# Patient Record
Sex: FEMALE | Race: WHITE | NOT HISPANIC OR LATINO | Employment: OTHER | URBAN - METROPOLITAN AREA
[De-identification: names, ages, dates, MRNs, and addresses within clinical notes are randomized per-mention and may not be internally consistent; named-entity substitution may affect disease eponyms.]

---

## 2017-07-11 PROBLEM — I20.9 ANGINA PECTORIS: Status: ACTIVE | Noted: 2017-07-11

## 2017-11-08 ENCOUNTER — LAB VISIT (OUTPATIENT)
Dept: LAB | Facility: OTHER | Age: 77
End: 2017-11-08
Attending: INTERNAL MEDICINE
Payer: COMMERCIAL

## 2017-11-08 DIAGNOSIS — N39.0 URINARY TRACT INFECTION WITHOUT HEMATURIA, SITE UNSPECIFIED: ICD-10-CM

## 2017-11-08 PROCEDURE — 87086 URINE CULTURE/COLONY COUNT: CPT

## 2017-11-09 LAB — BACTERIA UR CULT: NO GROWTH

## 2017-11-29 ENCOUNTER — HOSPITAL ENCOUNTER (OUTPATIENT)
Dept: RADIOLOGY | Facility: OTHER | Age: 77
Discharge: HOME | End: 2017-11-29
Attending: INTERNAL MEDICINE
Payer: COMMERCIAL

## 2017-11-29 DIAGNOSIS — J44.9 CHRONIC OBSTRUCTIVE PULMONARY DISEASE, UNSPECIFIED COPD TYPE: ICD-10-CM

## 2017-11-29 PROCEDURE — 71020 XR CHEST PA AND LATERAL: CPT | Mod: TC

## 2017-11-29 PROCEDURE — 71020 XR CHEST PA AND LATERAL: CPT | Mod: 26,,, | Performed by: RADIOLOGY

## 2017-12-07 ENCOUNTER — HOSPITAL ENCOUNTER (OUTPATIENT)
Dept: PULMONOLOGY | Facility: OTHER | Age: 77
Discharge: HOME | End: 2017-12-07
Attending: INTERNAL MEDICINE
Payer: COMMERCIAL

## 2017-12-07 DIAGNOSIS — J44.9 CHRONIC OBSTRUCTIVE PULMONARY DISEASE, UNSPECIFIED COPD TYPE: ICD-10-CM

## 2017-12-07 PROCEDURE — 94727 GAS DIL/WSHOT DETER LNG VOL: CPT

## 2017-12-07 PROCEDURE — 94060 EVALUATION OF WHEEZING: CPT

## 2017-12-07 PROCEDURE — 94729 DIFFUSING CAPACITY: CPT

## 2017-12-07 PROCEDURE — 25000242 PHARM REV CODE 250 ALT 637 W/ HCPCS

## 2017-12-12 NOTE — PROCEDURES
The patient has a forced vital capacity of 1.69 L, which is 64% of predicted,   FEV1 is 1.20 which is 66% of predicted.  FEV1/FVC is 103% of predicted.  FEF   25-75 is decreased at 36% of predicted.  There is no significant response to   bronchodilators, although the small airways improved to 18%.  Total lung   capacity is 71% based on a decreased expiratory reserve volume, which can be   seen with abdominal obesity or kyphoscoliosis.  Diffusing capacity is normal.    Flow volume loop is consistent with above.    ASSESSMENT:  1.  Mild airway obstruction primarily in the small airways with a mild but not   significant response to bronchodilators.  2.  Moderate restrictive ventilatory defect as discussed above.  3.  Normal diffusion capacity.      CCS/HN  dd: 12/11/2017 08:44:01 (CST)  td: 12/11/2017 13:32:12 (CST)  Doc ID   #[id number]  Job ID #[id number]    CC: JOCELYN TROY M.D.

## 2018-02-15 ENCOUNTER — HOSPITAL ENCOUNTER (OUTPATIENT)
Dept: PULMONOLOGY | Facility: OTHER | Age: 78
Discharge: HOME | End: 2018-02-15
Attending: INTERNAL MEDICINE
Payer: COMMERCIAL

## 2018-02-15 VITALS — WEIGHT: 221.31 LBS | BODY MASS INDEX: 37.78 KG/M2 | HEIGHT: 64 IN

## 2018-02-15 DIAGNOSIS — J98.4 RESTRICTIVE LUNG DISEASE: ICD-10-CM

## 2018-02-15 PROCEDURE — 94618 PULMONARY STRESS TESTING: CPT

## 2018-03-06 ENCOUNTER — OFFICE VISIT (OUTPATIENT)
Dept: CARDIOLOGY | Facility: CLINIC | Age: 78
End: 2018-03-06
Attending: INTERNAL MEDICINE
Payer: COMMERCIAL

## 2018-03-06 VITALS
BODY MASS INDEX: 37.05 KG/M2 | WEIGHT: 217 LBS | HEIGHT: 64 IN | DIASTOLIC BLOOD PRESSURE: 74 MMHG | SYSTOLIC BLOOD PRESSURE: 167 MMHG | HEART RATE: 86 BPM

## 2018-03-06 DIAGNOSIS — Z86.711 HISTORY OF PULMONARY EMBOLISM: ICD-10-CM

## 2018-03-06 DIAGNOSIS — G47.33 OSA (OBSTRUCTIVE SLEEP APNEA): ICD-10-CM

## 2018-03-06 DIAGNOSIS — J44.9 CHRONIC OBSTRUCTIVE PULMONARY DISEASE, UNSPECIFIED COPD TYPE: ICD-10-CM

## 2018-03-06 DIAGNOSIS — I50.30 DIASTOLIC HEART FAILURE, UNSPECIFIED HEART FAILURE CHRONICITY: ICD-10-CM

## 2018-03-06 DIAGNOSIS — E66.09 OBESITY DUE TO EXCESS CALORIES WITH SERIOUS COMORBIDITY, UNSPECIFIED CLASSIFICATION: ICD-10-CM

## 2018-03-06 DIAGNOSIS — I10 ESSENTIAL HYPERTENSION: Primary | ICD-10-CM

## 2018-03-06 PROCEDURE — 3078F DIAST BP <80 MM HG: CPT | Mod: CPTII,S$GLB,, | Performed by: INTERNAL MEDICINE

## 2018-03-06 PROCEDURE — 3077F SYST BP >= 140 MM HG: CPT | Mod: CPTII,S$GLB,, | Performed by: INTERNAL MEDICINE

## 2018-03-06 PROCEDURE — 99213 OFFICE O/P EST LOW 20 MIN: CPT | Mod: S$GLB,,, | Performed by: INTERNAL MEDICINE

## 2018-03-20 PROBLEM — G47.33 OSA (OBSTRUCTIVE SLEEP APNEA): Status: ACTIVE | Noted: 2018-03-20

## 2018-03-20 NOTE — PROGRESS NOTES
Subjective:    Patient ID:  Yun Silva is a 78 y.o. female     HPI  Here for F/U of HBP, diastolic CHF, H/O PE, COPD, obesity, THERESE.    I still get a little winded upon going up stairs.    Current Outpatient Prescriptions   Medication Sig    acebutolol (SECTRAL) 200 MG capsule TAKE 1 CAPSULE (200 MG TOTAL) BY MOUTH 2 (TWO) TIMES DAILY.    amLODIPine (NORVASC) 5 MG tablet TAKE 1 TABLET (5 MG TOTAL) BY MOUTH ONCE DAILY.    aspirin (ECOTRIN) 81 MG EC tablet Take 162 mg by mouth.     cycloSPORINE (RESTASIS) 0.05 % ophthalmic emulsion Place 1 drop into both eyes 2 (two) times daily.     darifenacin (ENABLEX) 15 mg 24 hr tablet Take 15 mg by mouth once daily.    doxycycline (VIBRA-TABS) 100 MG tablet Take 1 tablet (100 mg total) by mouth 2 (two) times daily.    gabapentin (NEURONTIN) 300 MG capsule Take I each evening    hydrALAZINE (APRESOLINE) 25 MG tablet TAKE 2 TABLETS BY MOUTH 4 TIMES A DAY    MULTIVITAMIN W-MINERALS/LUTEIN (CENTRUM SILVER ORAL) Take by mouth.    TOVIAZ 8 mg Tb24 Take 1 tablet by mouth once daily.    trimethoprim (TRIMPEX) 100 mg Tab Take 1 tablet (100 mg total) by mouth once daily.    valsartan (DIOVAN) 160 MG tablet TAKE 1 TABLET BY MOUTH EVERY DAY    valsartan-hydrochlorothiazide (DIOVAN-HCT) 160-25 mg per tablet TAKE 1 TABLET BY MOUTH TWICE A DAY    VESICARE 10 mg tablet Take 1 tablet by mouth once daily.    XARELTO 15 mg Tab TAKE 1 TABLET BY MOUTH DAILY WITH DINNER OR EVENING MEAL.     No current facility-administered medications for this visit.          Review of Systems   Constitution: Positive for weight gain. Negative for chills, decreased appetite, fever and weight loss.   HENT: Negative for congestion, hearing loss and sore throat.    Eyes: Negative for blurred vision, double vision and visual disturbance.   Cardiovascular: Negative for chest pain, claudication, dyspnea on exertion, leg swelling, palpitations and syncope.   Respiratory: Negative for cough, hemoptysis,  shortness of breath, sputum production and wheezing.    Endocrine: Negative for cold intolerance and heat intolerance.   Hematologic/Lymphatic: Negative for bleeding problem. Does not bruise/bleed easily.   Skin: Negative for color change, dry skin, flushing and itching.   Musculoskeletal: Negative for back pain, joint pain and myalgias.   Gastrointestinal: Negative for abdominal pain, anorexia, constipation, diarrhea, dysphagia, nausea and vomiting.        No bleeding per rectum   Genitourinary: Negative for dysuria, flank pain, frequency, hematuria and nocturia.   Neurological: Negative for dizziness, headaches, light-headedness, loss of balance, seizures and tremors.   Psychiatric/Behavioral: Negative for altered mental status and depression.        Objective:    Physical Exam   Constitutional: She is oriented to person, place, and time. She appears well-developed and well-nourished.   HENT:   Head: Normocephalic and atraumatic.   Nose: Nose normal.   Mouth/Throat: Oropharynx is clear and moist.   Eyes: Conjunctivae and EOM are normal. Pupils are equal, round, and reactive to light.   Neck: Neck supple. No tracheal deviation present. No thyromegaly present.   Cardiovascular: Normal rate, regular rhythm and intact distal pulses.  Exam reveals no gallop and no friction rub.    No murmur heard.  Pulmonary/Chest: No respiratory distress. She has no wheezes. She has no rales. She exhibits no tenderness.   Abdominal: Soft. Bowel sounds are normal. She exhibits no distension and no mass. There is no tenderness. There is no rebound and no guarding.   Musculoskeletal: Normal range of motion.   Lymphadenopathy:     She has no cervical adenopathy.   Neurological: She is alert and oriented to person, place, and time.   Skin: Skin is warm and dry.   Psychiatric: Her behavior is normal.         Assessment:       1. Essential hypertension    2. Diastolic heart failure, unspecified heart failure chronicity    3. History of  pulmonary embolism    4. Chronic obstructive pulmonary disease, unspecified COPD type    5. Obesity due to excess calories with serious comorbidity, unspecified classification    6. THERESE (obstructive sleep apnea)         Plan:       Urged weight reduction. Discussed at length.  Continue the same meds.

## 2018-05-16 ENCOUNTER — HOSPITAL ENCOUNTER (OUTPATIENT)
Dept: RADIOLOGY | Facility: OTHER | Age: 78
Discharge: HOME | End: 2018-05-16
Attending: INTERNAL MEDICINE
Payer: COMMERCIAL

## 2018-05-16 DIAGNOSIS — M79.672 FOOT PAIN, LEFT: ICD-10-CM

## 2018-05-16 PROCEDURE — 73630 X-RAY EXAM OF FOOT: CPT | Mod: TC,FY,LT

## 2018-05-16 PROCEDURE — 73630 X-RAY EXAM OF FOOT: CPT | Mod: 26,LT,, | Performed by: RADIOLOGY

## 2018-07-10 ENCOUNTER — OFFICE VISIT (OUTPATIENT)
Dept: CARDIOLOGY | Facility: CLINIC | Age: 78
End: 2018-07-10
Attending: INTERNAL MEDICINE
Payer: COMMERCIAL

## 2018-07-10 VITALS
HEART RATE: 64 BPM | BODY MASS INDEX: 36.02 KG/M2 | SYSTOLIC BLOOD PRESSURE: 141 MMHG | DIASTOLIC BLOOD PRESSURE: 76 MMHG | HEIGHT: 64 IN | WEIGHT: 211 LBS

## 2018-07-10 DIAGNOSIS — I82.509 CHRONIC DEEP VEIN THROMBOSIS (DVT) OF LOWER EXTREMITY, UNSPECIFIED LATERALITY, UNSPECIFIED VEIN: ICD-10-CM

## 2018-07-10 DIAGNOSIS — Z86.711 HISTORY OF PULMONARY EMBOLISM: ICD-10-CM

## 2018-07-10 DIAGNOSIS — G60.9 IDIOPATHIC PERIPHERAL NEUROPATHY: ICD-10-CM

## 2018-07-10 DIAGNOSIS — J44.9 CHRONIC OBSTRUCTIVE PULMONARY DISEASE, UNSPECIFIED COPD TYPE: ICD-10-CM

## 2018-07-10 DIAGNOSIS — I10 ESSENTIAL HYPERTENSION: Primary | ICD-10-CM

## 2018-07-10 DIAGNOSIS — I27.21 PULMONARY HYPERTENSION SECONDARY TO RAISED PULMONARY VASCULAR RESISTANCE: ICD-10-CM

## 2018-07-10 DIAGNOSIS — Z79.01 CHRONIC ANTICOAGULATION: ICD-10-CM

## 2018-07-10 DIAGNOSIS — E66.09 OBESITY DUE TO EXCESS CALORIES WITH SERIOUS COMORBIDITY, UNSPECIFIED CLASSIFICATION: ICD-10-CM

## 2018-07-10 DIAGNOSIS — I50.30 DIASTOLIC HEART FAILURE, UNSPECIFIED HF CHRONICITY: ICD-10-CM

## 2018-07-10 DIAGNOSIS — G47.33 OSA (OBSTRUCTIVE SLEEP APNEA): ICD-10-CM

## 2018-07-10 PROCEDURE — 3078F DIAST BP <80 MM HG: CPT | Mod: CPTII,S$GLB,, | Performed by: INTERNAL MEDICINE

## 2018-07-10 PROCEDURE — 3077F SYST BP >= 140 MM HG: CPT | Mod: CPTII,S$GLB,, | Performed by: INTERNAL MEDICINE

## 2018-07-10 PROCEDURE — 99214 OFFICE O/P EST MOD 30 MIN: CPT | Mod: S$GLB,,, | Performed by: INTERNAL MEDICINE

## 2018-07-10 NOTE — PROGRESS NOTES
Subjective:    Patient ID:  Yun Silva is a 78 y.o. female     HPI    Here for follow-up of essential hypertension, diastolic heart failure, previous history of DVT, pulmonary embolism, chronic anticoagulation, obesity, obstructive sleep apnea, chronic obstructive pulmonary disease, pulmonary hypertension, idiopathic peripheral neuropathy.    I had my total knee replacement in April and I am doing extremely well.  I had lost a lot of weight, but I have gained a lot of it back.  I am now getting around well.    Current Outpatient Prescriptions   Medication Sig    acebutolol (SECTRAL) 200 MG capsule TAKE ONE CAPSULE BY MOUTH TWICE A DAY    amLODIPine (NORVASC) 5 MG tablet TAKE 1 TABLET BY MOUTH ONCE DAILY    amLODIPine (NORVASC) 5 MG tablet TAKE 1 TABLET BY MOUTH ONCE DAILY    aspirin (ECOTRIN) 81 MG EC tablet Take 162 mg by mouth.     cycloSPORINE (RESTASIS) 0.05 % ophthalmic emulsion Place 1 drop into both eyes 2 (two) times daily.     darifenacin (ENABLEX) 15 mg 24 hr tablet Take 15 mg by mouth once daily.    doxycycline (VIBRA-TABS) 100 MG tablet Take 1 tablet (100 mg total) by mouth 2 (two) times daily.    gabapentin (NEURONTIN) 800 MG tablet Take 1 tablet by mouth 3 (three) times daily.    hydrALAZINE (APRESOLINE) 25 MG tablet TAKE 2 TABLETS BY MOUTH 4 TIMES A DAY    MULTIVITAMIN W-MINERALS/LUTEIN (CENTRUM SILVER ORAL) Take by mouth.    oxyCODONE-acetaminophen (PERCOCET) 5-325 mg per tablet Take 1-2 tablets by mouth every 4 (four) hours.    TOVIAZ 8 mg Tb24 Take 1 tablet by mouth once daily.    trimethoprim (TRIMPEX) 100 mg Tab Take 1 tablet (100 mg total) by mouth once daily.    valsartan (DIOVAN) 160 MG tablet TAKE 1 TABLET BY MOUTH EVERY DAY    valsartan-hydrochlorothiazide (DIOVAN-HCT) 160-25 mg per tablet TAKE 1 TABLET BY MOUTH TWICE A DAY    VESICARE 10 mg tablet Take 1 tablet by mouth once daily.    XARELTO 15 mg Tab TAKE 1 TABLET BY MOUTH DAILY WITH DINNER OR EVENING MEAL.     No  "current facility-administered medications for this visit.          Review of Systems   Constitution: Negative for chills, decreased appetite, fever, weight gain and weight loss.   HENT: Negative for congestion, hearing loss and sore throat.    Eyes: Negative for blurred vision, double vision and visual disturbance.   Cardiovascular: Negative for chest pain, claudication, dyspnea on exertion, leg swelling, palpitations and syncope.   Respiratory: Negative for cough, hemoptysis, shortness of breath, sputum production and wheezing.    Endocrine: Negative for cold intolerance and heat intolerance.   Hematologic/Lymphatic: Negative for bleeding problem. Does not bruise/bleed easily.   Skin: Negative for color change, dry skin, flushing and itching.   Musculoskeletal: Negative for back pain, joint pain and myalgias.   Gastrointestinal: Negative for abdominal pain, anorexia, constipation, diarrhea, dysphagia, nausea and vomiting.        No bleeding per rectum   Genitourinary: Negative for dysuria, flank pain, frequency, hematuria and nocturia.   Neurological: Negative for dizziness, headaches, light-headedness, loss of balance, seizures and tremors.   Psychiatric/Behavioral: Negative for altered mental status and depression.         Vitals:    07/10/18 1545   BP: (!) 141/76   Pulse: 64   Weight: 95.7 kg (211 lb)   Height: 5' 4" (1.626 m)     Objective:    Physical Exam   Constitutional: She is oriented to person, place, and time. She appears well-developed and well-nourished.   HENT:   Head: Normocephalic and atraumatic.   Nose: Nose normal.   Mouth/Throat: Oropharynx is clear and moist.   Eyes: Conjunctivae and EOM are normal. Pupils are equal, round, and reactive to light.   Neck: Neck supple. No tracheal deviation present. No thyromegaly present.   Cardiovascular: Normal rate, regular rhythm and intact distal pulses.  Exam reveals no gallop and no friction rub.    No murmur heard.  Pulmonary/Chest: No respiratory " distress. She has no wheezes. She has no rales. She exhibits no tenderness.   Abdominal: Soft. Bowel sounds are normal. She exhibits no distension and no mass. There is no tenderness. There is no rebound and no guarding.   Musculoskeletal: Normal range of motion.   Lymphadenopathy:     She has no cervical adenopathy.   Neurological: She is alert and oriented to person, place, and time.   Skin: Skin is warm and dry.   Psychiatric: Her behavior is normal.         Assessment:       1. Essential hypertension    2. Diastolic heart failure, unspecified HF chronicity    3. History of pulmonary embolism    4. Chronic anticoagulation    5. Chronic deep vein thrombosis (DVT) of lower extremity, unspecified laterality, unspecified vein    6. Obesity due to excess calories with serious comorbidity, unspecified classification    7. THERESE (obstructive sleep apnea)    8. Chronic obstructive pulmonary disease, unspecified COPD type    9. Pulmonary hypertension secondary to raised pulmonary vascular resistance    10. Idiopathic peripheral neuropathy         Plan:       Stable.  Continue the same pharmacological regimen.  Consider trying to reduce the gabapentin dose by half.  Urged low-carbohydrate diet, weight reduction.

## 2018-09-21 ENCOUNTER — CLINICAL SUPPORT (OUTPATIENT)
Dept: CARDIOLOGY | Facility: CLINIC | Age: 78
End: 2018-09-21
Payer: COMMERCIAL

## 2018-09-21 DIAGNOSIS — R00.1 BRADYCARDIA: Primary | ICD-10-CM

## 2018-09-21 PROCEDURE — 93224 XTRNL ECG REC UP TO 48 HRS: CPT | Mod: S$GLB,,, | Performed by: INTERNAL MEDICINE

## 2018-09-24 ENCOUNTER — OFFICE VISIT (OUTPATIENT)
Dept: CARDIOLOGY | Facility: CLINIC | Age: 78
End: 2018-09-24
Attending: INTERNAL MEDICINE
Payer: COMMERCIAL

## 2018-09-24 VITALS
HEART RATE: 80 BPM | HEIGHT: 64 IN | WEIGHT: 217 LBS | BODY MASS INDEX: 37.05 KG/M2 | SYSTOLIC BLOOD PRESSURE: 140 MMHG | DIASTOLIC BLOOD PRESSURE: 66 MMHG

## 2018-09-24 DIAGNOSIS — I10 ESSENTIAL HYPERTENSION: ICD-10-CM

## 2018-09-24 DIAGNOSIS — G47.33 OSA (OBSTRUCTIVE SLEEP APNEA): Primary | ICD-10-CM

## 2018-09-24 DIAGNOSIS — Z79.01 CHRONIC ANTICOAGULATION: ICD-10-CM

## 2018-09-24 DIAGNOSIS — Z86.711 HISTORY OF PULMONARY EMBOLISM: ICD-10-CM

## 2018-09-24 DIAGNOSIS — E66.09 OBESITY DUE TO EXCESS CALORIES WITH SERIOUS COMORBIDITY, UNSPECIFIED CLASSIFICATION: ICD-10-CM

## 2018-09-24 DIAGNOSIS — I27.21 PULMONARY HYPERTENSION SECONDARY TO RAISED PULMONARY VASCULAR RESISTANCE: ICD-10-CM

## 2018-09-24 DIAGNOSIS — J44.9 CHRONIC OBSTRUCTIVE PULMONARY DISEASE, UNSPECIFIED COPD TYPE: ICD-10-CM

## 2018-09-24 DIAGNOSIS — I44.1 SECOND DEGREE HEART BLOCK: ICD-10-CM

## 2018-09-24 DIAGNOSIS — I50.30 DIASTOLIC HEART FAILURE, UNSPECIFIED HF CHRONICITY: ICD-10-CM

## 2018-09-24 PROCEDURE — 3078F DIAST BP <80 MM HG: CPT | Mod: CPTII,S$GLB,, | Performed by: INTERNAL MEDICINE

## 2018-09-24 PROCEDURE — 99214 OFFICE O/P EST MOD 30 MIN: CPT | Mod: S$GLB,,, | Performed by: INTERNAL MEDICINE

## 2018-09-24 PROCEDURE — 3077F SYST BP >= 140 MM HG: CPT | Mod: CPTII,S$GLB,, | Performed by: INTERNAL MEDICINE

## 2018-09-24 NOTE — PROGRESS NOTES
Subjective:    Patient ID:  Yun Silva is a 78 y.o. female     HPI here for follow-up of essential hypertension, COPD, history of pulmonary embolism, obstructive sleep apnea, pulmonary hypertension, diastolic left ventricular dysfunction.    Saw Dr. alessandra Hsieh in the office last week, and complained to him that I was getting short of breath for the last 2 weeks.  An electrocardiogram done in his office showed 2-1 second-degree heart block.  A Holter monitor was done on Friday, and this showed episodes of second-degree heart block during sleep, with resultant pulse rates in the low 30s.    Current Outpatient Medications   Medication Sig    fluticasone/umeclidin/vilanter (TRELEGY ELLIPTA INHL) Inhale into the lungs.    acebutolol (SECTRAL) 200 MG capsule TAKE ONE CAPSULE BY MOUTH TWICE A DAY    albuterol (PROVENTIL/VENTOLIN HFA) 90 mcg/actuation inhaler 2 puffs Q 4h prn wheezing or shortness of breath.    amLODIPine (NORVASC) 5 MG tablet TAKE 1 TABLET BY MOUTH ONCE DAILY    amLODIPine (NORVASC) 5 MG tablet TAKE 1 TABLET BY MOUTH ONCE DAILY    aspirin (ECOTRIN) 81 MG EC tablet Take 162 mg by mouth.     cycloSPORINE (RESTASIS) 0.05 % ophthalmic emulsion Place 1 drop into both eyes 2 (two) times daily.     darifenacin (ENABLEX) 15 mg 24 hr tablet Take 15 mg by mouth once daily.    doxycycline (VIBRA-TABS) 100 MG tablet Take 1 tablet (100 mg total) by mouth 2 (two) times daily.    gabapentin (NEURONTIN) 800 MG tablet Take 1 tablet by mouth 2 (two) times daily.     hydrALAZINE (APRESOLINE) 25 MG tablet TAKE 2 TABLETS BY MOUTH 4 TIMES A DAY    MULTIVITAMIN W-MINERALS/LUTEIN (CENTRUM SILVER ORAL) Take by mouth.    oxyCODONE-acetaminophen (PERCOCET) 5-325 mg per tablet Take 1-2 tablets by mouth every 4 (four) hours.    TOVIAZ 8 mg Tb24 Take 1 tablet by mouth once daily.    trimethoprim (TRIMPEX) 100 mg Tab Take 1 tablet (100 mg total) by mouth once daily.    valsartan (DIOVAN) 160 MG tablet TAKE 1  "TABLET BY MOUTH EVERY DAY    valsartan-hydrochlorothiazide (DIOVAN-HCT) 160-25 mg per tablet TAKE 1 TABLET BY MOUTH TWICE A DAY    VESICARE 10 mg tablet Take 1 tablet by mouth once daily.    XARELTO 15 mg Tab TAKE 1 TABLET BY MOUTH DAILY WITH DINNER OR EVENING MEAL.    XARELTO 15 mg Tab TAKE 1 TABLET BY MOUTH DAILY WITH DINNER OR EVENING MEAL.     No current facility-administered medications for this visit.          Review of Systems   Constitution: Negative for chills, decreased appetite, fever, weight gain and weight loss.   HENT: Negative for congestion, hearing loss and sore throat.    Eyes: Negative for blurred vision, double vision and visual disturbance.   Cardiovascular: Positive for dyspnea on exertion. Negative for chest pain, claudication, leg swelling, palpitations and syncope.   Respiratory: Negative for cough, hemoptysis, shortness of breath, sputum production and wheezing.    Endocrine: Negative for cold intolerance and heat intolerance.   Hematologic/Lymphatic: Negative for bleeding problem. Does not bruise/bleed easily.   Skin: Negative for color change, dry skin, flushing and itching.   Musculoskeletal: Negative for back pain, joint pain and myalgias.   Gastrointestinal: Negative for abdominal pain, anorexia, constipation, diarrhea, dysphagia, nausea and vomiting.        No bleeding per rectum   Genitourinary: Negative for dysuria, flank pain, frequency, hematuria and nocturia.   Neurological: Negative for dizziness, headaches, light-headedness, loss of balance, seizures and tremors.   Psychiatric/Behavioral: Negative for altered mental status and depression.         Vitals:    09/24/18 1141   BP: (!) 140/66   Pulse: 80   Weight: 98.4 kg (217 lb)   Height: 5' 4" (1.626 m)     Objective:    Physical Exam   Constitutional: She is oriented to person, place, and time. She appears well-developed and well-nourished.   HENT:   Head: Normocephalic and atraumatic.   Nose: Nose normal.   Mouth/Throat: " Oropharynx is clear and moist.   Eyes: Conjunctivae and EOM are normal. Pupils are equal, round, and reactive to light.   Neck: Neck supple. No tracheal deviation present. No thyromegaly present.   Cardiovascular: Normal rate and regular rhythm. Exam reveals no gallop and no friction rub.   No murmur heard.  Pulmonary/Chest: No respiratory distress. She has no wheezes. She has no rales. She exhibits no tenderness.   Abdominal: Soft. Bowel sounds are normal. She exhibits no distension and no mass. There is no tenderness. There is no rebound and no guarding.   Musculoskeletal: Normal range of motion.   Lymphadenopathy:     She has no cervical adenopathy.   Neurological: She is alert and oriented to person, place, and time.   Skin: Skin is warm and dry.   Psychiatric: Her behavior is normal.         Assessment:       1. THERESE (obstructive sleep apnea)    2. Second degree heart block    3. Obesity due to excess calories with serious comorbidity, unspecified classification    4. History of pulmonary embolism    5. Chronic anticoagulation    6. Essential hypertension    7. Diastolic heart failure, unspecified HF chronicity    8. Chronic obstructive pulmonary disease, unspecified COPD type    9. Pulmonary hypertension secondary to raised pulmonary vascular resistance         Plan:       Consult Dr. Otoniel Hogan for placement of a dual-chamber pacemaker.

## 2018-09-26 DIAGNOSIS — R00.1 BRADYCARDIA: ICD-10-CM

## 2018-09-26 DIAGNOSIS — I44.1 AV BLOCK, 2ND DEGREE: Primary | ICD-10-CM

## 2018-09-28 ENCOUNTER — HOSPITAL ENCOUNTER (OUTPATIENT)
Dept: PREADMISSION TESTING | Facility: OTHER | Age: 78
Discharge: HOME | End: 2018-09-28
Attending: INTERNAL MEDICINE
Payer: COMMERCIAL

## 2018-09-28 VITALS
DIASTOLIC BLOOD PRESSURE: 69 MMHG | BODY MASS INDEX: 36.88 KG/M2 | HEART RATE: 62 BPM | OXYGEN SATURATION: 95 % | TEMPERATURE: 98 F | WEIGHT: 216 LBS | HEIGHT: 64 IN | SYSTOLIC BLOOD PRESSURE: 143 MMHG

## 2018-09-28 LAB
ANION GAP SERPL CALC-SCNC: 9 MMOL/L
APTT BLDCRRT: 36.2 SEC
BASOPHILS # BLD AUTO: 0.03 K/UL
BASOPHILS NFR BLD: 0.3 %
BUN SERPL-MCNC: 12 MG/DL
CALCIUM SERPL-MCNC: 9.7 MG/DL
CHLORIDE SERPL-SCNC: 103 MMOL/L
CO2 SERPL-SCNC: 29 MMOL/L
CREAT SERPL-MCNC: 0.7 MG/DL
DIFFERENTIAL METHOD: ABNORMAL
EOSINOPHIL # BLD AUTO: 0.2 K/UL
EOSINOPHIL NFR BLD: 1.6 %
ERYTHROCYTE [DISTWIDTH] IN BLOOD BY AUTOMATED COUNT: 15.5 %
EST. GFR  (AFRICAN AMERICAN): >60 ML/MIN/1.73 M^2
EST. GFR  (NON AFRICAN AMERICAN): >60 ML/MIN/1.73 M^2
GLUCOSE SERPL-MCNC: 98 MG/DL
HCT VFR BLD AUTO: 38.4 %
HGB BLD-MCNC: 11.6 G/DL
INR PPP: 1
LYMPHOCYTES # BLD AUTO: 3.9 K/UL
LYMPHOCYTES NFR BLD: 37.3 %
MCH RBC QN AUTO: 26 PG
MCHC RBC AUTO-ENTMCNC: 30.2 G/DL
MCV RBC AUTO: 86 FL
MONOCYTES # BLD AUTO: 0.8 K/UL
MONOCYTES NFR BLD: 7.7 %
NEUTROPHILS # BLD AUTO: 5.5 K/UL
NEUTROPHILS NFR BLD: 52.7 %
PLATELET # BLD AUTO: 271 K/UL
PMV BLD AUTO: 10.4 FL
POTASSIUM SERPL-SCNC: 4.1 MMOL/L
PROTHROMBIN TIME: 11.2 SEC
RBC # BLD AUTO: 4.47 M/UL
SODIUM SERPL-SCNC: 141 MMOL/L
WBC # BLD AUTO: 10.43 K/UL

## 2018-09-28 PROCEDURE — 85610 PROTHROMBIN TIME: CPT

## 2018-09-28 PROCEDURE — 93005 ELECTROCARDIOGRAM TRACING: CPT

## 2018-09-28 PROCEDURE — 36415 COLL VENOUS BLD VENIPUNCTURE: CPT

## 2018-09-28 PROCEDURE — 80048 BASIC METABOLIC PNL TOTAL CA: CPT

## 2018-09-28 PROCEDURE — 85730 THROMBOPLASTIN TIME PARTIAL: CPT

## 2018-09-28 PROCEDURE — 85025 COMPLETE CBC W/AUTO DIFF WBC: CPT

## 2018-09-28 PROCEDURE — 93010 ELECTROCARDIOGRAM REPORT: CPT | Mod: ,,, | Performed by: INTERNAL MEDICINE

## 2018-09-28 RX ORDER — ACEBUTOLOL HYDROCHLORIDE 200 MG/1
200 CAPSULE ORAL 2 TIMES DAILY
COMMUNITY
End: 2019-01-04 | Stop reason: SDUPTHER

## 2018-09-28 NOTE — DISCHARGE INSTRUCTIONS
PRE-ADMIT TESTING -  [phone]    [address]  [place] [facility]          Your surgery has been scheduled at [facility]. We are pleased to have the opportunity to serve you. For Further Information please call [phone].    On the day of surgery please report to the Information Desk on the 1st floor.    · CONTACT YOUR PHYSICIAN'S OFFICE THE DAY PRIOR TO YOUR SURGERY TO OBTAIN YOUR ARRIVAL TIME.     · The evening before surgery do not eat anything after 9 p.m. ( this includes hard candy, chewing gum and mints).  You may only have GATORADE, POWERADE AND WATER  from 9 p.m. until you leave your home.   DO NOT DRINK ANY LIQUIDS ON THE WAY TO THE HOSPITAL.      SPECIAL MEDICATION INSTRUCTIONS: TAKE medications checked off by the Anesthesiologist on your Medication List.    Angiogram Patients: Take medications as instructed by your physician, including aspirin.     Surgery Patients:    If you take ASPIRIN - Your PHYSICIAN/SURGEON will need to inform you IF/OR when you need to stop taking aspirin prior to your surgery.     Do Not take any medications containing IBUPROFEN.  Do Not Wear any make-up or dark nail polish   (especially eye make-up) to surgery. If you come to surgery with makeup on you will be required to remove the makeup or nail polish.    Do not shave your surgical area at least 5 days prior to your surgery. The surgical prep will be performed at the hospital according to Infection Control regulations.    Leave all valuables at home.   Do Not wear any jewelry or watches, including any metal in body piercings.  Contact Lens must be removed before surgery. Either do not wear the contact lens or bring a case and solution for storage.  Please bring a container for eyeglasses or dentures as required.  Bring any paperwork your physician has provided, such as consent forms,  history and physicals, doctor's orders, etc.   Bring comfortable clothes that are loose fitting to wear upon  discharge. Take into consideration the type of surgery being performed.  Maintain your diet as advised per your physician the day prior to surgery.      Adequate rest the night before surgery is advised.   Park in the Parking lot behind the hospital or in the [facility] across the street from the parking lot. Parking is complimentary.  If you will be discharged the same day as your procedure, please arrange for a responsible adult to drive you home or to accompany you if traveling by taxi.   YOU WILL NOT BE PERMITTED TO DRIVE OR TO LEAVE THE HOSPITAL ALONE AFTER SURGERY.   It is strongly recommended that you arrange for someone to remain with you for the first 24 hrs following your surgery.       Thank you for your cooperation.  The Staff of [facility].        Bathing Instructions                                                                 Please shower the evening before and morning of your procedure with    ANTIBACTERIAL SOAP. ( DIAL, etc )  Concentrate on the surgical area   for at least 3 minutes and rinse completely. Dry off as usual.   Do not use any deodorant, powder, body lotions, perfume, after shave or    cologne.

## 2018-10-02 ENCOUNTER — ANESTHESIA (OUTPATIENT)
Dept: CARDIOLOGY | Facility: OTHER | Age: 78
End: 2018-10-02
Payer: COMMERCIAL

## 2018-10-02 ENCOUNTER — HOSPITAL ENCOUNTER (OUTPATIENT)
Facility: OTHER | Age: 78
Discharge: HOME | End: 2018-10-03
Attending: INTERNAL MEDICINE | Admitting: INTERNAL MEDICINE
Payer: COMMERCIAL

## 2018-10-02 ENCOUNTER — ANESTHESIA EVENT (OUTPATIENT)
Dept: CARDIOLOGY | Facility: OTHER | Age: 78
End: 2018-10-02
Payer: COMMERCIAL

## 2018-10-02 DIAGNOSIS — I49.9 ARRHYTHMIA: ICD-10-CM

## 2018-10-02 DIAGNOSIS — I44.30 AV BLOCK: ICD-10-CM

## 2018-10-02 DIAGNOSIS — D32.9 BENIGN NEOPLASM OF MENINGES: ICD-10-CM

## 2018-10-02 PROCEDURE — C1785 PMKR, DUAL, RATE-RESP: HCPCS

## 2018-10-02 PROCEDURE — 25000003 PHARM REV CODE 250: Performed by: ANESTHESIOLOGY

## 2018-10-02 PROCEDURE — 93005 ELECTROCARDIOGRAM TRACING: CPT

## 2018-10-02 PROCEDURE — 25000003 PHARM REV CODE 250: Performed by: INTERNAL MEDICINE

## 2018-10-02 PROCEDURE — 99900035 HC TECH TIME PER 15 MIN (STAT)

## 2018-10-02 PROCEDURE — 63600175 PHARM REV CODE 636 W HCPCS

## 2018-10-02 PROCEDURE — 37000009 HC ANESTHESIA EA ADD 15 MINS: Performed by: INTERNAL MEDICINE

## 2018-10-02 PROCEDURE — 93010 ELECTROCARDIOGRAM REPORT: CPT | Mod: ,,, | Performed by: INTERNAL MEDICINE

## 2018-10-02 PROCEDURE — 63600175 PHARM REV CODE 636 W HCPCS: Performed by: INTERNAL MEDICINE

## 2018-10-02 PROCEDURE — 63600175 PHARM REV CODE 636 W HCPCS: Performed by: NURSE ANESTHETIST, CERTIFIED REGISTERED

## 2018-10-02 PROCEDURE — 25000003 PHARM REV CODE 250

## 2018-10-02 PROCEDURE — 25500020 PHARM REV CODE 255

## 2018-10-02 PROCEDURE — 37000008 HC ANESTHESIA 1ST 15 MINUTES: Performed by: INTERNAL MEDICINE

## 2018-10-02 RX ORDER — ACETAMINOPHEN 10 MG/ML
INJECTION, SOLUTION INTRAVENOUS
Status: DISCONTINUED | OUTPATIENT
Start: 2018-10-02 | End: 2018-10-02

## 2018-10-02 RX ORDER — GABAPENTIN 400 MG/1
800 CAPSULE ORAL 2 TIMES DAILY
Status: DISCONTINUED | OUTPATIENT
Start: 2018-10-02 | End: 2018-10-03 | Stop reason: HOSPADM

## 2018-10-02 RX ORDER — FENTANYL CITRATE 50 UG/ML
INJECTION, SOLUTION INTRAMUSCULAR; INTRAVENOUS
Status: DISCONTINUED | OUTPATIENT
Start: 2018-10-02 | End: 2018-10-02

## 2018-10-02 RX ORDER — PROPOFOL 10 MG/ML
VIAL (ML) INTRAVENOUS
Status: DISCONTINUED | OUTPATIENT
Start: 2018-10-02 | End: 2018-10-02

## 2018-10-02 RX ORDER — DIPHENHYDRAMINE HYDROCHLORIDE 50 MG/ML
INJECTION INTRAMUSCULAR; INTRAVENOUS
Status: DISCONTINUED | OUTPATIENT
Start: 2018-10-02 | End: 2018-10-02

## 2018-10-02 RX ORDER — SODIUM CHLORIDE, SODIUM LACTATE, POTASSIUM CHLORIDE, CALCIUM CHLORIDE 600; 310; 30; 20 MG/100ML; MG/100ML; MG/100ML; MG/100ML
INJECTION, SOLUTION INTRAVENOUS CONTINUOUS PRN
Status: DISCONTINUED | OUTPATIENT
Start: 2018-10-02 | End: 2018-10-02

## 2018-10-02 RX ORDER — MIDAZOLAM HYDROCHLORIDE 1 MG/ML
INJECTION INTRAMUSCULAR; INTRAVENOUS
Status: DISCONTINUED | OUTPATIENT
Start: 2018-10-02 | End: 2018-10-02

## 2018-10-02 RX ORDER — ACETAMINOPHEN 325 MG/1
650 TABLET ORAL EVERY 4 HOURS PRN
Status: DISCONTINUED | OUTPATIENT
Start: 2018-10-02 | End: 2018-10-03 | Stop reason: HOSPADM

## 2018-10-02 RX ORDER — CEFAZOLIN SODIUM 2 G/50ML
2 SOLUTION INTRAVENOUS
Status: COMPLETED | OUTPATIENT
Start: 2018-10-02 | End: 2018-10-02

## 2018-10-02 RX ORDER — AMLODIPINE BESYLATE 5 MG/1
5 TABLET ORAL DAILY
Status: DISCONTINUED | OUTPATIENT
Start: 2018-10-03 | End: 2018-10-03 | Stop reason: HOSPADM

## 2018-10-02 RX ORDER — HYDRALAZINE HYDROCHLORIDE 25 MG/1
50 TABLET, FILM COATED ORAL EVERY 6 HOURS
Status: DISCONTINUED | OUTPATIENT
Start: 2018-10-02 | End: 2018-10-03 | Stop reason: HOSPADM

## 2018-10-02 RX ORDER — VALSARTAN 80 MG/1
160 TABLET ORAL DAILY
Status: DISCONTINUED | OUTPATIENT
Start: 2018-10-03 | End: 2018-10-03 | Stop reason: HOSPADM

## 2018-10-02 RX ADMIN — MIDAZOLAM HYDROCHLORIDE 2 MG: 1 INJECTION, SOLUTION INTRAMUSCULAR; INTRAVENOUS at 09:10

## 2018-10-02 RX ADMIN — PROPOFOL 20 MG: 10 INJECTION, EMULSION INTRAVENOUS at 09:10

## 2018-10-02 RX ADMIN — FENTANYL CITRATE 25 MCG: 50 INJECTION, SOLUTION INTRAMUSCULAR; INTRAVENOUS at 09:10

## 2018-10-02 RX ADMIN — PROPOFOL 30 MG: 10 INJECTION, EMULSION INTRAVENOUS at 10:10

## 2018-10-02 RX ADMIN — PROPOFOL 25 MG: 10 INJECTION, EMULSION INTRAVENOUS at 10:10

## 2018-10-02 RX ADMIN — PROPOFOL 20 MG: 10 INJECTION, EMULSION INTRAVENOUS at 11:10

## 2018-10-02 RX ADMIN — ACETAMINOPHEN 650 MG: 325 TABLET ORAL at 09:10

## 2018-10-02 RX ADMIN — SODIUM CHLORIDE, SODIUM LACTATE, POTASSIUM CHLORIDE, AND CALCIUM CHLORIDE: 600; 310; 30; 20 INJECTION, SOLUTION INTRAVENOUS at 08:10

## 2018-10-02 RX ADMIN — PROPOFOL 40 MG: 10 INJECTION, EMULSION INTRAVENOUS at 10:10

## 2018-10-02 RX ADMIN — HYDRALAZINE HYDROCHLORIDE 50 MG: 25 TABLET, FILM COATED ORAL at 10:10

## 2018-10-02 RX ADMIN — DIPHENHYDRAMINE HYDROCHLORIDE 6.25 MG: 50 INJECTION, SOLUTION INTRAMUSCULAR; INTRAVENOUS at 09:10

## 2018-10-02 RX ADMIN — PROPOFOL 25 MG: 10 INJECTION, EMULSION INTRAVENOUS at 09:10

## 2018-10-02 RX ADMIN — CEFAZOLIN SODIUM 2 G: 2 SOLUTION INTRAVENOUS at 09:10

## 2018-10-02 RX ADMIN — PROPOFOL 20 MG: 10 INJECTION, EMULSION INTRAVENOUS at 10:10

## 2018-10-02 RX ADMIN — ACETAMINOPHEN 1000 MG: 10 INJECTION, SOLUTION INTRAVENOUS at 09:10

## 2018-10-02 RX ADMIN — PROPOFOL 10 MG: 10 INJECTION, EMULSION INTRAVENOUS at 11:10

## 2018-10-02 RX ADMIN — GABAPENTIN 800 MG: 400 CAPSULE ORAL at 10:10

## 2018-10-02 RX ADMIN — FENTANYL CITRATE 100 MCG: 50 INJECTION, SOLUTION INTRAMUSCULAR; INTRAVENOUS at 09:10

## 2018-10-02 NOTE — H&P
[facility] EP/ Cardiology  History and Physical     Patient Name: Yun Silva  MRN: [id number]  Admission Date: 10/2/2018  Code Status: Prior   Attending Provider: Otoniel Hogan MD   Primary Care Physician: Yoni Jennings MD  Principal Problem: Second degree heart block    Patient information was obtained from patient and past medical records.     Subjective:     Chief Complaint:  Second degree heart block    HPI: Ms. Silva is a 78 year old female patient referred by Dr. Arechiga for permanent pacemaker due to second degree AV block. COPD, s/p PE, HFpEF, worsening RUDD recently. Holter with 2:1 2nd degree AVB. Since initial consultation in clinic last week patient denies fever, chills, infection.     Past Medical History:   Diagnosis Date    Arthritis     Basal cell carcinoma     CHF (congestive heart failure)     COPD (chronic obstructive pulmonary disease)     DVT (deep venous thrombosis)     Hypertension     Meningioma     Peripheral neuropathy     Pulmonary emboli     Sleep apnea        Past Surgical History:   Procedure Laterality Date    CARPAL TUNNEL RELEASE Bilateral     CHOLECYSTECTOMY      DILATION AND CURETTAGE OF UTERUS      EYE SURGERY      bilateral cataracts    MOH's surgery      TONSILLECTOMY      TRIGGER FINGER RELEASE         Review of patient's allergies indicates:   Allergen Reactions    No known drug allergies        No current facility-administered medications on file prior to encounter.      Current Outpatient Medications on File Prior to Encounter   Medication Sig    albuterol (PROVENTIL/VENTOLIN HFA) 90 mcg/actuation inhaler 2 puffs Q 4h prn wheezing or shortness of breath.    cycloSPORINE (RESTASIS) 0.05 % ophthalmic emulsion Place 1 drop into both eyes 2 (two) times daily.     gabapentin (NEURONTIN) 800 MG tablet Take 1 tablet by mouth 2 (two) times daily.     hydrALAZINE (APRESOLINE) 25 MG tablet TAKE 2 TABLETS BY MOUTH 4 TIMES A DAY     valsartan (DIOVAN) 160 MG tablet TAKE 1 TABLET BY MOUTH EVERY DAY    valsartan-hydrochlorothiazide (DIOVAN-HCT) 160-25 mg per tablet TAKE 1 TABLET BY MOUTH TWICE A DAY    VESICARE 10 mg tablet Take 1 tablet by mouth once daily.    amLODIPine (NORVASC) 5 MG tablet TAKE 1 TABLET BY MOUTH ONCE DAILY    amLODIPine (NORVASC) 5 MG tablet TAKE 1 TABLET BY MOUTH ONCE DAILY    aspirin (ECOTRIN) 81 MG EC tablet Take 162 mg by mouth.     doxycycline (VIBRA-TABS) 100 MG tablet Take 1 tablet (100 mg total) by mouth 2 (two) times daily.    fluticasone/umeclidin/vilanter (TRELEGY ELLIPTA INHL) Inhale into the lungs as needed.     MULTIVITAMIN W-MINERALS/LUTEIN (CENTRUM SILVER ORAL) Take by mouth.    trimethoprim (TRIMPEX) 100 mg Tab Take 1 tablet (100 mg total) by mouth once daily.    XARELTO 15 mg Tab TAKE 1 TABLET BY MOUTH DAILY WITH DINNER OR EVENING MEAL.    XARELTO 15 mg Tab TAKE 1 TABLET BY MOUTH DAILY WITH DINNER OR EVENING MEAL.     Family History     Problem Relation (Age of Onset)    Heart disease Mother, Father, Brother    Hypertension Mother        Tobacco Use    Smoking status: Never Smoker    Smokeless tobacco: Never Used   Substance and Sexual Activity    Alcohol use: No    Drug use: No    Sexual activity: Not on file     Review of Systems   Cardiovascular: Positive for dyspnea on exertion.   All other systems reviewed and are negative.    Objective:     Vital Signs (Most Recent):  Temp: 98.1 °F (36.7 °C) (10/02/18 0822)  Pulse: 87 (10/02/18 0822)  Resp: 18 (10/02/18 0822)  BP: (!) 156/74 (10/02/18 0822)  SpO2: 95 % (10/02/18 0822) Vital Signs (24h Range):  Temp:  [98.1 °F (36.7 °C)] 98.1 °F (36.7 °C)  Pulse:  [87] 87  Resp:  [18] 18  SpO2:  [95 %] 95 %  BP: (156)/(74) 156/74     Weight: 98 kg (216 lb)  Body mass index is 37.08 kg/m².    SpO2: 95 %       No intake or output data in the 24 hours ending 10/02/18 0836    Lines/Drains/Airways          None          Physical Exam   Constitutional: She  is oriented to person, place, and time. She appears well-developed and well-nourished.   HENT:   Head: Normocephalic and atraumatic.   Eyes: Conjunctivae are normal. Pupils are equal, round, and reactive to light.   Neck: Normal range of motion. Neck supple. No JVD present. No thyromegaly present.   Cardiovascular: Normal heart sounds and intact distal pulses.   Pulmonary/Chest: Effort normal and breath sounds normal. No respiratory distress.   Abdominal: Soft. Bowel sounds are normal.   Musculoskeletal: Normal range of motion.   Neurological: She is alert and oriented to person, place, and time.   Skin: Skin is warm and dry.   Psychiatric: She has a normal mood and affect. Her behavior is normal.     ASA 2, Mallampati: 3    Significant Labs: BMP: No results for input(s): GLU, NA, K, CL, CO2, BUN, CREATININE, CALCIUM, MG in the last 48 hours., CMP No results for input(s): NA, K, CL, CO2, GLU, BUN, CREATININE, CALCIUM, PROT, ALBUMIN, BILITOT, ALKPHOS, AST, ALT, ANIONGAP, ESTGFRAFRICA, EGFRNONAA in the last 48 hours., CBC No results for input(s): WBC, HGB, HCT, PLT in the last 48 hours. and INR No results for input(s): INR, PROTIME in the last 48 hours.    Significant Imaging: Echocardiogram: 2D echo with color flow doppler: No results found for this or any previous visit.  Assessment and Plan:     Active Diagnoses:    Diagnosis Date Noted POA    AV block [I44.30] 10/02/2018 Yes      Problems Resolved During this Admission:       VTE Risk Mitigation (From admission, onward)    None        AV block, 2nd degree. Senile acquired. No reversible cause. Plan for dual chamber PPM (attempt His bundle pacing) with MAC anesthesia due to sleep apnea, with preoperative Ancef. R/B/A reviewed with patient and spouse. Risks include but not limited to loss of life, loss of organ/organ function, perforation of myocardium/blood vessels, bleeding, PTx, reaction to sedation/ medications, infection, need for reoperation. Written informed  consents obtained.      Priya Davila NP  [facility] EP/ Cardiology   [facility]

## 2018-10-02 NOTE — NURSING
Pt arrived to floor via stretcher with transfer escort and transferred to bed. IVF started, SCDs applied, oriented to room, call light placed within reach, bed low and locked, and family at bedside. No complaints of pain. Incision noted to L side of chest, CDI. No acute distress noted at this time. Will continue to monitor.

## 2018-10-02 NOTE — PLAN OF CARE
CM met with pt and spouse, Missael, [phone], for initial discharge planning assessment.    Pt confirmed her PCP is correct in Epic, as is her pharmacy of choice.    Pt and spouse state no CM needs for discharge.     10/02/18 8080   Discharge Assessment   Assessment Type Discharge Planning Assessment   Confirmed/corrected address and phone number on facesheet? Yes   Assessment information obtained from? Caregiver;Patient   Expected Length of Stay (days) 2   Communicated expected length of stay with patient/caregiver yes   Prior to hospitilization cognitive status: Alert/Oriented   Prior to hospitalization functional status: Independent   Current cognitive status: Alert/Oriented   Lives With spouse   Is patient able to care for self after discharge? Yes   Who are your caregiver(s) and their phone number(s)? Missael Hajionberg, spouse, [phone]   Patient's perception of discharge disposition home or selfcare   Equipment Currently Used at Home (pt has all equipment needed, not using at this time)   Do you have any problems affording any of your prescribed medications? No   Is the patient taking medications as prescribed? yes   Does the patient have transportation home? Yes   Transportation Available car;family or friend will provide   Discharge Plan A Home   Discharge Plan B Home   Patient/Family In Agreement With Plan yes

## 2018-10-02 NOTE — PLAN OF CARE
Problem: Fall Risk (Adult)  Goal: Absence of Falls  Patient will demonstrate the desired outcomes by discharge/transition of care.  Outcome: Ongoing (interventions implemented as appropriate)  No significant events this shift. Remains free from fall, injury, and skin breakdown. Ambulates independently to bathroom. VSS stable on RA and afebrile. Positions self independently. Pain controlled with PO PRN meds. Neuro checks WDL. TEDs/SCDs maintained.Tolerating ordered diet. IV site WNL. Plan of care reviewed with patient and all questions answered. Bed low, locked w/ bed alarm on. Call light within reach. Purposeful rounding performed. No other complaints at this time.

## 2018-10-02 NOTE — ANESTHESIA POSTPROCEDURE EVALUATION
"Anesthesia Post Evaluation    Patient: Yun Silva    Procedure(s) Performed: Procedure(s) (LRB):  INSERTION, CARDIAC PACEMAKER, DUAL CHAMBER (Left)    Final Anesthesia Type: general  Patient location during evaluation: Cath Lab  Patient participation: Yes- Able to Participate  Level of consciousness: awake and alert, awake and oriented  Pain management: adequate  Airway patency: patent  PONV status at discharge: No PONV  Anesthetic complications: no      Cardiovascular status: blood pressure returned to baseline  Respiratory status: unassisted, spontaneous ventilation and room air  Hydration status: euvolemic  Follow-up not needed.        Visit Vitals  BP (!) 156/74 (BP Location: Right arm, Patient Position: Sitting)   Pulse 87   Temp 36.7 °C (98.1 °F) (Oral)   Resp 18   Ht 5' 4" (1.626 m)   Wt 98 kg (216 lb)   SpO2 95%   BMI 37.08 kg/m²       Pain/Abhishek Score: Presence of Pain: denies (10/2/2018  8:27 AM)        "

## 2018-10-02 NOTE — ANESTHESIA PREPROCEDURE EVALUATION
10/02/2018  Yun Silva is a 78 y.o., female.    Anesthesia Evaluation    I have reviewed the Patient Summary Reports.    I have reviewed the Nursing Notes.   I have reviewed the Medications.     Review of Systems  Anesthesia Hx:  No problems with previous Anesthesia  Denies Family Hx of Anesthesia complications.   Denies Personal Hx of Anesthesia complications.   Social:  Non-Smoker    Hematology/Oncology:  Hematology Normal   Oncology Normal     EENT/Dental:EENT/Dental Normal   Cardiovascular:   Exercise tolerance: poor Hypertension Dysrhythmias Angina CHF    Pulmonary:   COPD Sleep Apnea    Renal/:  Renal/ Normal     Musculoskeletal:   Arthritis     Neurological:   Neuromuscular Disease,   Peripheral Neuropathy    Endocrine:  Endocrine Normal    Dermatological:  Skin Normal    Psych:  Psychiatric Normal           Physical Exam  General:  Well nourished, Obesity    Airway/Jaw/Neck:  Airway Findings: Mouth Opening: Normal Tongue: Normal  General Airway Assessment: Adult  Mallampati: I  TM Distance: Normal, at least 6 cm  Jaw/Neck Findings:  Neck ROM: Normal ROM      Dental:  Dental Findings: In tact             Anesthesia Plan  Type of Anesthesia, risks & benefits discussed:  Anesthesia Type:  general  Patient's Preference:   Intra-op Monitoring Plan:   Intra-op Monitoring Plan Comments:   Post Op Pain Control Plan:   Post Op Pain Control Plan Comments:   Induction:    Beta Blocker:         Informed Consent: Patient understands risks and agrees with Anesthesia plan.  Questions answered. Anesthesia consent signed with patient.  ASA Score: 3     Day of Surgery Review of History & Physical:    H&P update referred to the surgeon.         Ready For Surgery From Anesthesia Perspective.

## 2018-10-03 VITALS
OXYGEN SATURATION: 98 % | DIASTOLIC BLOOD PRESSURE: 70 MMHG | WEIGHT: 216 LBS | SYSTOLIC BLOOD PRESSURE: 167 MMHG | TEMPERATURE: 98 F | RESPIRATION RATE: 18 BRPM | BODY MASS INDEX: 36.88 KG/M2 | HEART RATE: 76 BPM | HEIGHT: 64 IN

## 2018-10-03 PROCEDURE — 25000003 PHARM REV CODE 250: Performed by: INTERNAL MEDICINE

## 2018-10-03 RX ORDER — CEPHALEXIN 500 MG/1
500 CAPSULE ORAL EVERY 12 HOURS
Qty: 10 CAPSULE | Refills: 0 | Status: SHIPPED | OUTPATIENT
Start: 2018-10-03 | End: 2018-11-02

## 2018-10-03 RX ADMIN — AMLODIPINE BESYLATE 5 MG: 5 TABLET ORAL at 09:10

## 2018-10-03 RX ADMIN — ACETAMINOPHEN 650 MG: 325 TABLET ORAL at 06:10

## 2018-10-03 RX ADMIN — VALSARTAN 160 MG: 80 TABLET, FILM COATED ORAL at 09:10

## 2018-10-03 RX ADMIN — GABAPENTIN 800 MG: 400 CAPSULE ORAL at 09:10

## 2018-10-03 RX ADMIN — HYDRALAZINE HYDROCHLORIDE 50 MG: 25 TABLET, FILM COATED ORAL at 06:10

## 2018-10-03 NOTE — PLAN OF CARE
Problem: Patient Care Overview  Goal: Plan of Care Review  Outcome: Outcome(s) achieved Date Met: 10/03/18  Pt eager & in agreement w/ DC. VU of DC instructions and the need to attend follow-up appointment --paperwork and Abx prescription  passed & explained-. IV removed w/ cath tip intact, WNL. Voiding, ambulating, & tolerating PO well. Dressing CDI. To be DCd home w/ family--will be escorted downstairs via  transport team once dressed, ready & ride arrives. Free from falls, injury, or skin breakdown this hospital admission. Pt discharged in no distress with spouse.

## 2018-10-03 NOTE — DISCHARGE INSTRUCTIONS
Discharge Instructions for Pacemaker Implantation  You have had a procedure to insert a pacemaker. Once inside your body, this small electronic device helps keep your heart from beating too slowly. A pacemaker cant fix existing heart problems. But it can help you feel better and have more energy. As you recover, follow all of the instructions you are given, including those below.  Activity  · Dont drive until your doctor says its OK. Plan to have someone drive you home after the procedure.  · Follow the instructions you are given about limiting your activity.  · If you are fitted with an arm sling, keep your arm in the sling for as long as your doctor tells you to. Most often, the sling will be removed the following day though you may be instructed to sleep with it on for a period to prevent damage to the pacemaker while it's healing.  · Do not raise your arm on the incision side above shoulder level or stretch your arm behind your back for as long as directed by your doctor. This gives the leads a chance to secure themselves inside your heart.  · Ask your doctor when you can expect to return to work. Depending on the type of work you do, you may have restrictions until your cardiologist clears you for unrestricted activity.  · You can still exercise. Its good for your body and your heart. Talk with your doctor about an exercise plan and the types of exercise to minimize the risk of damaging your pacemaker.  Other precautions  · Follow your doctor's directions carefully for wound care. If there is a dressing, ask whether you should remove it or keep it on until your next visit. Never put any creams, lotions, or products like peroxide on an incision unless your doctor tells you to. Do not get the incision wet until your doctor says it's OK.  · Every day, take your temperature and check your incision for signs of infection (redness, swelling, drainage, or warmth). Do this for 7 days or as advised by your  doctor.  · Learn to take your own pulse. Keep a record of your results. Ask your doctor what pulse rate means you should call for medical attention.  · Before you receive any treatment, tell all healthcare providers (including your dentist) that you have a pacemaker.  · You will be given an ID card that contains information about your pacemaker. Always carry this card with you. You can show this card if your pacemaker sets off a metal detector. You should also show it to avoid screening with a hand-held security wand.  · Keep your cell phone away from your pacemaker. Dont carry the phone in your shirt pocket overlying the pacemaker, even when its turned off.  · Avoid strong magnets. Examples are those used in MRIs or in hand-held security wands. Some pacemakers are now safe to use with MRI scanners. Ask your doctor if you have such a pacemaker.  · Avoid strong electrical fields. Examples are those made by radio transmitting towers, ham radios, and heavy-duty electrical equipment.  · Avoid leaning over the open jorgensen of a running car. A running engine creates an electrical field. Most household and yard appliances will not cause any problems. If you use any large power tools, such as an industrial , talk with your doctor.   Follow-up care  · See your cardiologist in the next 7 to 10 days. Call and make an appointment as soon as you get home.  · Make regular follow-up appointments with your doctor. He or she will check the pacemaker to make sure its working properly.  · Plan on having periodic check-ups with your healthcare provider to evaluate the battery life of your pacemaker. Depending on your device and how much your body uses the pacing functions of the pacemaker, you will need a new device generator implanted at some point, generally about every 5 to 7 years.  · Some pacemakers have a built-in antenna that can transmit information such as trouble alerts over the internet to your doctor. Ask your  doctor if your pacemaker is capable of remote monitoring.  When should I call my healthcare provider?  Call 911 if you have:  · Chest pain  · Severe trouble breathing     Call your healthcare provider right away if you have any of these:  · Dizziness  · Lack of energy  · Fainting spells  · Twitching chest muscles  · Rapid pulse or pounding heartbeat  · Shortness of breath  · Pain around your pacemaker  · Fever above 100.4°F (38°C) or other signs of infection (redness, swelling, drainage, or warmth at the incision site)  · Your incision is not healing or your incision separates or opens  · Hiccups that wont stop  · Redness, severe swelling, drainage, worsening pain, bleeding, or warmth at the incision site  · If your pacemaker generator feels loose or like it is wiggling in the pocket under the skin   Date Last Reviewed: 6/1/2016  © 9841-2778 [employer]. [address] [place] [address], [place] [address]. All rights reserved. This information is not intended as a substitute for professional medical care. Always follow your healthcare professional's instructions.

## 2018-10-03 NOTE — DISCHARGE SUMMARY
[facility] EP/ Cardiology  Discharge Summary      Patient Name: Yun Silva  MRN: [id number]  Admission Date: 10/2/2018  Hospital Length of Stay: 0 days  Discharge Date and Time:  10/03/2018 12:33 PM  Attending Physician: Otoniel Hogan MD  Discharging Provider: Jamir Salter NP  Primary Care Physician: Yoni Jennings MD    HPI: Ms. Silva is a 78 year old female patient referred by Dr. Arechiga for permanent pacemaker due to second degree AV block. COPD, s/p PE, HFpEF, worsening RUDD recently. Holter with 2:1 2nd degree AVB. Since initial consultation in clinic last week patient denies fever, chills, infection. Underwent dual chamber PPM implantation yesterday with RN procedural sedation and with preop IV Ancef. Procedure without complication. Observed overnight. No PTx on xray. Discharged home next day.        Procedure(s) (LRB):  INSERTION, CARDIAC PACEMAKER, DUAL CHAMBER (Left)     Indwelling Lines/Drains at time of discharge:  Lines/Drains/Airways          None          Hospital Course (synopsis of major diagnoses, care, treatment, and services provided during the course of the hospital stay): Ms. Silva is a 78 year old female patient referred by Dr. Arechiga for permanent pacemaker due to second degree AV block. COPD, s/p PE, HFpEF, worsening RUDD recently. Holter with 2:1 2nd degree AVB. Since initial consultation in clinic last week patient denies fever, chills, infection. Underwent dual chamber PPM implantation yesterday with RN procedural sedation and with preop IV Ancef. Procedure without complication. Observed overnight. No PTx on xray. Discharged home next day.    Consults:   Consults (From admission, onward)        Status Ordering Provider     Inpatient consult to Anesthesiology  Once     Provider:  (Not yet assigned)    Acknowledged JAMIR SALTER          Significant Diagnostic Studies: Labs: BMP: No results for input(s): GLU, NA, K, CL, CO2, BUN,  CREATININE, CALCIUM, MG in the last 48 hours., CMP No results for input(s): NA, K, CL, CO2, GLU, BUN, CREATININE, CALCIUM, PROT, ALBUMIN, BILITOT, ALKPHOS, AST, ALT, ANIONGAP, ESTGFRAFRICA, EGFRNONAA in the last 48 hours. and CBC No results for input(s): WBC, HGB, HCT, PLT in the last 48 hours.    Pending Diagnostic Studies:     None          Final Active Diagnoses:    Diagnosis Date Noted POA    PRINCIPAL PROBLEM:  AV block [I44.30] 10/02/2018 Yes      Problems Resolved During this Admission:       Discharged Condition: good    Follow Up:  Follow-up Information     Priya Quiñonez NP In 1 week.    Contact information:  [facility] Electrophysiology [address] [facility] 10/10/2018 1pm               Patient Instructions:   Incision: leave dressing in place, do not get incision wet    Diet: cardiac    Activity: no heavy lifting, pushing, pulling, no raising left arm above shoulder height, no reaching behind back, no driving x 2 weeks    Medications:  Reconciled Home Medications:     New Medication:  Keflex 500mg one tablet by mouth twice daily x 5 days       Medication List      CHANGE how you take these medications    amLODIPine 5 MG tablet  Commonly known as:  NORVASC  TAKE 1 TABLET BY MOUTH ONCE DAILY  What changed:  Another medication with the same name was removed. Continue taking this medication, and follow the directions you see here.        CONTINUE taking these medications    acebutolol 200 MG capsule  Commonly known as:  SECTRAL  Take 200 mg by mouth 2 (two) times daily.     albuterol 90 mcg/actuation inhaler  Commonly known as:  PROVENTIL/VENTOLIN HFA  2 puffs Q 4h prn wheezing or shortness of breath.     aspirin 81 MG EC tablet  Commonly known as:  ECOTRIN  Take 162 mg by mouth.     AZO CRANBERRY ORAL  Take by mouth once daily.     CENTRUM SILVER ORAL  Take by mouth.     gabapentin 800 MG tablet  Commonly known as:  NEURONTIN  Take 1 tablet by mouth 2 (two) times daily.     hydrALAZINE 25 MG tablet  Commonly known as:   APRESOLINE  TAKE 2 TABLETS BY MOUTH 4 TIMES A DAY     Lactobacillus rhamnosus GG 10 billion cell capsule  Commonly known as:  CULTURELLE  Take 1 capsule by mouth once daily.     RESTASIS 0.05 % ophthalmic emulsion  Generic drug:  cycloSPORINE  Place 1 drop into both eyes 2 (two) times daily.     TRELEGY ELLIPTA INHL  Inhale into the lungs as needed.     trimethoprim 100 mg Tab  Commonly known as:  TRIMPEX  Take 1 tablet (100 mg total) by mouth once daily.     valsartan 160 MG tablet  Commonly known as:  DIOVAN  TAKE 1 TABLET BY MOUTH EVERY DAY     VESICARE 10 MG tablet  Generic drug:  solifenacin  Take 1 tablet by mouth once daily.     * XARELTO 15 mg Tab  Generic drug:  rivaroxaban  TAKE 1 TABLET BY MOUTH DAILY WITH DINNER OR EVENING MEAL.     * XARELTO 15 mg Tab  Generic drug:  rivaroxaban  TAKE 1 TABLET BY MOUTH DAILY WITH DINNER OR EVENING MEAL.         * This list has 2 medication(s) that are the same as other medications prescribed for you. Read the directions carefully, and ask your doctor or other care provider to review them with you.            STOP taking these medications    doxycycline 100 MG tablet  Commonly known as:  VIBRA-TABS     valsartan-hydrochlorothiazide 160-25 mg per tablet  Commonly known as:  DIOVAN-HCT            Time spent on the discharge of patient: 30 minutes    Priya Davila NP  Cardiology  [facility]

## 2018-10-09 ENCOUNTER — OFFICE VISIT (OUTPATIENT)
Dept: CARDIOLOGY | Facility: CLINIC | Age: 78
End: 2018-10-09
Attending: INTERNAL MEDICINE
Payer: COMMERCIAL

## 2018-10-09 VITALS
SYSTOLIC BLOOD PRESSURE: 166 MMHG | HEART RATE: 75 BPM | DIASTOLIC BLOOD PRESSURE: 73 MMHG | WEIGHT: 216 LBS | HEIGHT: 64 IN | BODY MASS INDEX: 36.88 KG/M2

## 2018-10-09 DIAGNOSIS — Z95.0 CARDIAC PACEMAKER IN SITU: ICD-10-CM

## 2018-10-09 DIAGNOSIS — I44.1 SECOND DEGREE HEART BLOCK: Primary | ICD-10-CM

## 2018-10-09 DIAGNOSIS — I50.32 CHRONIC DIASTOLIC CONGESTIVE HEART FAILURE: ICD-10-CM

## 2018-10-09 DIAGNOSIS — I27.21 PULMONARY HYPERTENSION SECONDARY TO RAISED PULMONARY VASCULAR RESISTANCE: ICD-10-CM

## 2018-10-09 DIAGNOSIS — J44.9 CHRONIC OBSTRUCTIVE PULMONARY DISEASE, UNSPECIFIED COPD TYPE: ICD-10-CM

## 2018-10-09 DIAGNOSIS — Z86.711 HISTORY OF PULMONARY EMBOLISM: ICD-10-CM

## 2018-10-09 DIAGNOSIS — E66.09 OBESITY DUE TO EXCESS CALORIES WITH SERIOUS COMORBIDITY, UNSPECIFIED CLASSIFICATION: ICD-10-CM

## 2018-10-09 DIAGNOSIS — G47.33 OSA (OBSTRUCTIVE SLEEP APNEA): ICD-10-CM

## 2018-10-09 DIAGNOSIS — I10 ESSENTIAL HYPERTENSION: ICD-10-CM

## 2018-10-09 PROCEDURE — 99214 OFFICE O/P EST MOD 30 MIN: CPT | Mod: S$GLB,,, | Performed by: INTERNAL MEDICINE

## 2018-10-09 PROCEDURE — 3077F SYST BP >= 140 MM HG: CPT | Mod: CPTII,S$GLB,, | Performed by: INTERNAL MEDICINE

## 2018-10-09 PROCEDURE — 3078F DIAST BP <80 MM HG: CPT | Mod: CPTII,S$GLB,, | Performed by: INTERNAL MEDICINE

## 2018-10-09 PROCEDURE — 93000 ELECTROCARDIOGRAM COMPLETE: CPT | Mod: S$GLB,,, | Performed by: INTERNAL MEDICINE

## 2018-10-09 RX ORDER — HYDROCHLOROTHIAZIDE 25 MG/1
25 TABLET ORAL
COMMUNITY
End: 2019-01-03 | Stop reason: SDUPTHER

## 2018-10-19 ENCOUNTER — CLINICAL SUPPORT (OUTPATIENT)
Dept: CARDIOLOGY | Facility: CLINIC | Age: 78
End: 2018-10-19
Payer: COMMERCIAL

## 2018-10-19 DIAGNOSIS — R06.02 SOB (SHORTNESS OF BREATH): Primary | ICD-10-CM

## 2018-10-19 PROCEDURE — 93306 TTE W/DOPPLER COMPLETE: CPT | Mod: S$GLB,,, | Performed by: INTERNAL MEDICINE

## 2018-10-23 PROBLEM — Z95.0 CARDIAC PACEMAKER IN SITU: Status: ACTIVE | Noted: 2018-10-23

## 2018-10-23 NOTE — PROGRESS NOTES
Subjective:    Patient ID:  Yun Silva is a 78 y.o. female     HPI   Here following recent placement of a cardiac pacemaker for second-degree heart block.    After placement of the pacemaker I do not feel much better.  I still get short of breath on getting around.    Current Outpatient Medications   Medication Sig    hydroCHLOROthiazide (HYDRODIURIL) 25 MG tablet Take 25 mg by mouth every Mon, Wed, Fri.    acebutolol (SECTRAL) 200 MG capsule Take 200 mg by mouth 2 (two) times daily.    acebutolol (SECTRAL) 200 MG capsule TAKE ONE CAPSULE BY MOUTH TWICE A DAY    acebutolol (SECTRAL) 200 MG capsule TAKE 1 CAPSULE (200 MG TOTAL) BY MOUTH 2 (TWO) TIMES DAILY.    albuterol (PROVENTIL/VENTOLIN HFA) 90 mcg/actuation inhaler 2 puffs Q 4h prn wheezing or shortness of breath.    amLODIPine (NORVASC) 5 MG tablet TAKE 1 TABLET BY MOUTH ONCE DAILY    amLODIPine (NORVASC) 5 MG tablet TAKE 1 TABLET BY MOUTH ONCE DAILY    aspirin (ECOTRIN) 81 MG EC tablet Take 162 mg by mouth.     cephALEXin (KEFLEX) 500 MG capsule Take 1 capsule (500 mg total) by mouth every 12 (twelve) hours.    cranberry fruit concentrate (AZO CRANBERRY ORAL) Take by mouth once daily.    cycloSPORINE (RESTASIS) 0.05 % ophthalmic emulsion Place 1 drop into both eyes 2 (two) times daily.     fluticasone/umeclidin/vilanter (TRELEGY ELLIPTA INHL) Inhale into the lungs as needed.     gabapentin (NEURONTIN) 800 MG tablet Take 1 tablet by mouth once daily.     hydrALAZINE (APRESOLINE) 25 MG tablet TAKE 2 TABLETS BY MOUTH 4 TIMES A DAY    Lactobacillus rhamnosus GG (CULTURELLE) 10 billion cell capsule Take 1 capsule by mouth once daily.    MULTIVITAMIN W-MINERALS/LUTEIN (CENTRUM SILVER ORAL) Take by mouth.    trimethoprim (TRIMPEX) 100 mg Tab Take 1 tablet (100 mg total) by mouth once daily.    valsartan (DIOVAN) 160 MG tablet TAKE 1 TABLET BY MOUTH EVERY DAY    VESICARE 10 mg tablet Take 1 tablet by mouth once daily.    XARELTO 15 mg Tab TAKE  "1 TABLET BY MOUTH DAILY WITH DINNER OR EVENING MEAL.    XARELTO 15 mg Tab TAKE 1 TABLET BY MOUTH DAILY WITH DINNER OR EVENING MEAL.    XARELTO 15 mg Tab TAKE 1 TABLET BY MOUTH DAILY WITH DINNER OR EVENING MEAL.     No current facility-administered medications for this visit.          Review of Systems   Constitution: Negative for chills, decreased appetite, fever, weight gain and weight loss.   HENT: Negative for congestion, hearing loss and sore throat.    Eyes: Negative for blurred vision, double vision and visual disturbance.   Cardiovascular: Positive for dyspnea on exertion. Negative for chest pain, claudication, leg swelling, palpitations and syncope.   Respiratory: Negative for cough, hemoptysis, shortness of breath, sputum production and wheezing.    Endocrine: Negative for cold intolerance and heat intolerance.   Hematologic/Lymphatic: Negative for bleeding problem. Does not bruise/bleed easily.   Skin: Negative for color change, dry skin, flushing and itching.   Musculoskeletal: Negative for back pain, joint pain and myalgias.   Gastrointestinal: Negative for abdominal pain, anorexia, constipation, diarrhea, dysphagia, nausea and vomiting.        No bleeding per rectum   Genitourinary: Negative for dysuria, flank pain, frequency, hematuria and nocturia.   Neurological: Negative for dizziness, headaches, light-headedness, loss of balance, seizures and tremors.   Psychiatric/Behavioral: Negative for altered mental status and depression.         Vitals:    10/09/18 1429   BP: (!) 166/73   Pulse: 75   Weight: 98 kg (216 lb)   Height: 5' 4" (1.626 m)     Objective:    Physical Exam   Constitutional: She is oriented to person, place, and time. She appears well-developed and well-nourished.   HENT:   Head: Normocephalic and atraumatic.   Nose: Nose normal.   Mouth/Throat: Oropharynx is clear and moist.   Eyes: Conjunctivae and EOM are normal. Pupils are equal, round, and reactive to light.   Neck: Neck supple. " No tracheal deviation present. No thyromegaly present.   Cardiovascular: Normal rate, regular rhythm and intact distal pulses. Exam reveals no gallop and no friction rub.   No murmur heard.  Pulmonary/Chest: No respiratory distress. She has no wheezes. She has no rales. She exhibits no tenderness.   Abdominal: Soft. Bowel sounds are normal. She exhibits no distension and no mass. There is no tenderness. There is no rebound and no guarding.   Musculoskeletal: Normal range of motion.   Lymphadenopathy:     She has no cervical adenopathy.   Neurological: She is alert and oriented to person, place, and time.   Skin: Skin is warm and dry.   Psychiatric: Her behavior is normal.         Assessment:       1. Second degree heart block    2. Essential hypertension    3. Chronic diastolic congestive heart failure    4. Chronic obstructive pulmonary disease, unspecified COPD type    5. Pulmonary hypertension secondary to raised pulmonary vascular resistance    6. History of pulmonary embolism    7. Cardiac pacemaker in situ    8. Obesity due to excess calories with serious comorbidity, unspecified classification    9. THERESE (obstructive sleep apnea)         Plan:       Check an echocardiogram  See Dr. Xavi Hsieh for follow-up  Continue the present pharmacological regimen.

## 2018-10-29 ENCOUNTER — CLINICAL SUPPORT (OUTPATIENT)
Dept: CARDIOLOGY | Facility: CLINIC | Age: 78
End: 2018-10-29
Payer: COMMERCIAL

## 2018-10-29 DIAGNOSIS — Z95.0 CARDIAC PACEMAKER IN SITU: Primary | ICD-10-CM

## 2018-10-29 PROCEDURE — 93280 PM DEVICE PROGR EVAL DUAL: CPT | Mod: S$GLB,,, | Performed by: INTERNAL MEDICINE

## 2018-11-12 DIAGNOSIS — I26.99 PULMONARY EMBOLISM: Primary | ICD-10-CM

## 2018-12-11 ENCOUNTER — HOSPITAL ENCOUNTER (OUTPATIENT)
Dept: PULMONOLOGY | Facility: OTHER | Age: 78
Discharge: HOME | End: 2018-12-11
Attending: INTERNAL MEDICINE
Payer: MEDICARE

## 2018-12-11 VITALS — BODY MASS INDEX: 36.55 KG/M2 | WEIGHT: 214.06 LBS | HEIGHT: 64 IN

## 2018-12-11 PROCEDURE — 94618 PULMONARY STRESS TESTING: CPT

## 2018-12-17 ENCOUNTER — HOSPITAL ENCOUNTER (OUTPATIENT)
Dept: PULMONOLOGY | Facility: OTHER | Age: 78
Discharge: HOME | End: 2018-12-17
Attending: INTERNAL MEDICINE
Payer: COMMERCIAL

## 2018-12-17 PROCEDURE — G0238 OTH RESP PROC, INDIV: HCPCS

## 2018-12-17 PROCEDURE — G0237 THERAPEUTIC PROCD STRG ENDUR: HCPCS

## 2018-12-17 NOTE — PROGRESS NOTES
[facility]  Pulmonary Rehab  Session Summary    SUMMARY     Session Data  Session Number: 3  Time In: 1100  Time Out: 1230  Target Heart Rate Zone: Minimum: 85 bpm  Target Heart Rate Zone: Maximum: 114 bpm  Patient Motivation: Good  Patient Effort: Good      Pre Exercise Vitals  SpO2: 95 %  Supplemental O2?: No  Pulse: 80  BP: 145/74  Respiration Rate: 20 per minute  Rating of Perceived Exertion (RPE) Scale: 2      During Exercise Vitals  SpO2: 96 %  Pulse: 84  Rating of Perceived Exertion (RPE) Scale: 2      Post Exercise Vitals  SpO2: 96 %  Pulse: 69  BP: 137/72  Respiration Rate: 20 per minute  Rating of Perceived Exertion (RPE) Scale: 2       Modality  Modality: Leg Free Weights            Arm Ergometer  Time: 5 minutes  RPM: 40 RPMs  Level: 1            Nustep  Time: 5 minutes  Steps: 59  Load: 1      Recumbent Bike  Time: 5 minutes  RPM: 40 RPMs  Level: 1      Treadmill  Time: 5 minutes  MPH: 0.8 MPH  stGstrstastdstest:st st1st Distance: 0.07 Miles          Biceps  lbs: 3 lbs  Sets: 3  Reps: 10            Triceps  lbs: 3 lbs  Sets: 3  Reps: 10      Lower Body  lbs: 3 lbs  Sets: 2  Reps: 10    Education        Therapist Notes  Patient arrived on time with a great attitude. Today was patient first day at Pulmonary Rehab. RPE 1-3. No complaints. Overall patient done well.Alison Shields, CRT

## 2018-12-18 ENCOUNTER — OFFICE VISIT (OUTPATIENT)
Dept: CARDIOLOGY | Facility: CLINIC | Age: 78
End: 2018-12-18
Attending: INTERNAL MEDICINE
Payer: COMMERCIAL

## 2018-12-18 VITALS
HEART RATE: 87 BPM | HEIGHT: 64 IN | WEIGHT: 215 LBS | DIASTOLIC BLOOD PRESSURE: 80 MMHG | SYSTOLIC BLOOD PRESSURE: 161 MMHG | BODY MASS INDEX: 36.7 KG/M2

## 2018-12-18 DIAGNOSIS — Z79.01 CHRONIC ANTICOAGULATION: ICD-10-CM

## 2018-12-18 DIAGNOSIS — G47.33 OSA (OBSTRUCTIVE SLEEP APNEA): ICD-10-CM

## 2018-12-18 DIAGNOSIS — E66.09 OBESITY DUE TO EXCESS CALORIES WITH SERIOUS COMORBIDITY, UNSPECIFIED CLASSIFICATION: ICD-10-CM

## 2018-12-18 DIAGNOSIS — Z86.711 HISTORY OF PULMONARY EMBOLISM: ICD-10-CM

## 2018-12-18 DIAGNOSIS — I44.1 SECOND DEGREE HEART BLOCK: ICD-10-CM

## 2018-12-18 DIAGNOSIS — Z95.0 CARDIAC PACEMAKER IN SITU: ICD-10-CM

## 2018-12-18 DIAGNOSIS — I50.32 CHRONIC DIASTOLIC HEART FAILURE: ICD-10-CM

## 2018-12-18 DIAGNOSIS — J98.4 RESTRICTIVE LUNG DISEASE: ICD-10-CM

## 2018-12-18 DIAGNOSIS — J44.9 CHRONIC OBSTRUCTIVE PULMONARY DISEASE, UNSPECIFIED COPD TYPE: Primary | ICD-10-CM

## 2018-12-18 DIAGNOSIS — I27.21 PULMONARY HYPERTENSION SECONDARY TO RAISED PULMONARY VASCULAR RESISTANCE: ICD-10-CM

## 2018-12-18 DIAGNOSIS — I82.509 CHRONIC DEEP VEIN THROMBOSIS (DVT) OF LOWER EXTREMITY, UNSPECIFIED LATERALITY, UNSPECIFIED VEIN: ICD-10-CM

## 2018-12-18 DIAGNOSIS — I10 ESSENTIAL HYPERTENSION: ICD-10-CM

## 2018-12-18 PROCEDURE — 99214 OFFICE O/P EST MOD 30 MIN: CPT | Mod: S$GLB,,, | Performed by: INTERNAL MEDICINE

## 2018-12-18 PROCEDURE — 3077F SYST BP >= 140 MM HG: CPT | Mod: CPTII,S$GLB,, | Performed by: INTERNAL MEDICINE

## 2018-12-18 PROCEDURE — 3079F DIAST BP 80-89 MM HG: CPT | Mod: CPTII,S$GLB,, | Performed by: INTERNAL MEDICINE

## 2018-12-18 NOTE — PROGRESS NOTES
Subjective:    Patient ID:  Yun Silva is a 78 y.o. female     HPI   Here for follow-up of chronic obstructive lung disease, restrictive lung disease, pulmonary hypertension, chronic diastolic heart failure, second-degree heart block, cardiac pacemaker, essential hypertension, chronic DVTs, history of pulmonary emboli, chronic anticoagulation, obesity, obstructive sleep apnea.    I feel better, I can now get around without getting very short of breath.  I have started pulmonary rehabilitation.  I have not lost any weight, but I will try to work on it.    Current Outpatient Medications   Medication Sig    acebutolol (SECTRAL) 200 MG capsule Take 200 mg by mouth 2 (two) times daily.    albuterol (PROVENTIL/VENTOLIN HFA) 90 mcg/actuation inhaler 2 puffs Q 4h prn wheezing or shortness of breath.    amLODIPine (NORVASC) 5 MG tablet TAKE 1 TABLET BY MOUTH ONCE DAILY    aspirin (ECOTRIN) 81 MG EC tablet Take 162 mg by mouth.     cranberry fruit concentrate (AZO CRANBERRY ORAL) Take by mouth once daily.    cycloSPORINE (RESTASIS) 0.05 % ophthalmic emulsion Place 1 drop into both eyes 2 (two) times daily.     fluticasone/umeclidin/vilanter (TRELEGY ELLIPTA INHL) Inhale into the lungs as needed.     hydrALAZINE (APRESOLINE) 25 MG tablet TAKE 2 TABLETS BY MOUTH 4 TIMES A DAY    hydroCHLOROthiazide (HYDRODIURIL) 25 MG tablet Take 25 mg by mouth every Mon, Wed, Fri.    INCRUSE ELLIPTA 62.5 mcg/actuation DsDv     Lactobacillus rhamnosus GG (CULTURELLE) 10 billion cell capsule Take 1 capsule by mouth once daily.    MULTIVITAMIN W-MINERALS/LUTEIN (CENTRUM SILVER ORAL) Take by mouth.    mupirocin calcium 2% (BACTROBAN) 2 % cream Apply topically 3 (three) times daily.    traZODone (DESYREL) 100 MG tablet     valsartan (DIOVAN) 160 MG tablet TAKE 1 TABLET BY MOUTH EVERY DAY    VESICARE 10 mg tablet Take 1 tablet by mouth once daily.    XARELTO 15 mg Tab TAKE 1 TABLET BY MOUTH DAILY WITH DINNER OR EVENING MEAL.  "    No current facility-administered medications for this visit.          Review of Systems   Constitution: Negative for chills, decreased appetite, fever, weight gain and weight loss.   HENT: Negative for congestion, hearing loss and sore throat.    Eyes: Negative for blurred vision, double vision and visual disturbance.   Cardiovascular: Positive for dyspnea on exertion and leg swelling. Negative for chest pain, claudication, palpitations and syncope.   Respiratory: Negative for cough, hemoptysis, shortness of breath, sputum production and wheezing.    Endocrine: Negative for cold intolerance and heat intolerance.   Hematologic/Lymphatic: Negative for bleeding problem. Does not bruise/bleed easily.   Skin: Negative for color change, dry skin, flushing and itching.   Musculoskeletal: Negative for back pain, joint pain and myalgias.   Gastrointestinal: Negative for abdominal pain, anorexia, constipation, diarrhea, dysphagia, nausea and vomiting.        No bleeding per rectum   Genitourinary: Negative for dysuria, flank pain, frequency, hematuria and nocturia.   Neurological: Negative for dizziness, headaches, light-headedness, loss of balance, seizures and tremors.   Psychiatric/Behavioral: Negative for altered mental status and depression.         Vitals:    12/18/18 1431   BP: (!) 161/80   Pulse: 87   Weight: 97.5 kg (215 lb)   Height: 5' 4" (1.626 m)    Blood pressure recheck 140/78.    Objective:    Physical Exam   Constitutional: She is oriented to person, place, and time. She appears well-developed and well-nourished.   HENT:   Head: Normocephalic and atraumatic.   Nose: Nose normal.   Mouth/Throat: Oropharynx is clear and moist.   Eyes: Conjunctivae and EOM are normal. Pupils are equal, round, and reactive to light.   Neck: Neck supple. No tracheal deviation present. No thyromegaly present.   Cardiovascular: Normal rate and regular rhythm. Exam reveals no gallop and no friction rub.   No murmur " heard.  Pulmonary/Chest: No respiratory distress. She has no wheezes. She has no rales. She exhibits no tenderness.   Abdominal: Soft. Bowel sounds are normal. She exhibits no distension and no mass. There is no tenderness. There is no rebound and no guarding.   Musculoskeletal: Normal range of motion. She exhibits edema.   Lymphadenopathy:     She has no cervical adenopathy.   Neurological: She is alert and oriented to person, place, and time.   Skin: Skin is warm and dry.   Psychiatric: Her behavior is normal.         Assessment:       1. Chronic obstructive pulmonary disease, unspecified COPD type    2. Restrictive lung disease    3. Pulmonary hypertension secondary to raised pulmonary vascular resistance    4. Second degree heart block    5. Chronic diastolic heart failure    6. Cardiac pacemaker in situ    7. Essential hypertension    8. History of pulmonary embolism    9. Chronic deep vein thrombosis (DVT) of lower extremity, unspecified laterality, unspecified vein    10. Chronic anticoagulation    11. Obesity due to excess calories with serious comorbidity, unspecified classification    12. THERESE (obstructive sleep apnea)         Plan:       Stable.  Continue the present pharmacological regimen.  Again strongly urged weight reduction.

## 2018-12-19 ENCOUNTER — HOSPITAL ENCOUNTER (OUTPATIENT)
Dept: PULMONOLOGY | Facility: OTHER | Age: 78
Discharge: HOME | End: 2018-12-19
Attending: INTERNAL MEDICINE
Payer: COMMERCIAL

## 2018-12-19 VITALS — WEIGHT: 214.5 LBS | BODY MASS INDEX: 36.82 KG/M2

## 2018-12-19 PROCEDURE — G0238 OTH RESP PROC, INDIV: HCPCS

## 2018-12-19 PROCEDURE — G0237 THERAPEUTIC PROCD STRG ENDUR: HCPCS

## 2018-12-19 NOTE — PROGRESS NOTES
[facility]  Pulmonary Rehab  Session Summary    SUMMARY     Session Data  Session Number: 4  Time In: 1100  Time Out: 1230  Weight: 97.3 kg (214 lb 8.1 oz)  Target Heart Rate Zone: Minimum: 85 bpm  Target Heart Rate Zone: Maximum: 114 bpm  Patient Motivation: Good  Patient Effort: Good      Pre Exercise Vitals  SpO2: 98 %  Pulse: 81  BP: 148/79  Respiration Rate: 20 per minute      During Exercise Vitals  SpO2: 98 %  Respiration Rate: 20 per minute      Post Exercise Vitals  SpO2: 98 %  Pulse: 70  BP: 141/71  Respiration Rate: 20 per minute  Rating of Perceived Exertion (RPE) Scale: 1       Modality  Modality: Leg Free Weights    Arm Ergometer  Time: 5 minutes  RPM: 29 RPMs(.41 distance)  Level: 1    Nustep  Time: 5 minutes  Steps: 55(.16 distance)  Load: 1      Recumbent Bike  Time: 5 minutes  RPM: 48 RPMs(.7 distance)  Level: 1      Treadmill  Time: 5 minutes  MPH: 0.8 MPH  stGstrstastdstest:st st1st Distance: 0.08 Miles      Biceps  lbs: 3 lbs  Sets: 3  Reps: 10      Chest  lbs: 3 lbs  Sets: 3  Reps: 10      Triceps  lbs: 3 lbs  Sets: 3  Reps: 10      Lower Body  lbs: 3 lbs  Sets: 2  Reps: 10    Education    Therapist Notes Pt arrived to therapy session and completed exercise routine without complications.  Pt appears to be in no apparent respiratory distress.Gabe Tyler, CRT

## 2019-01-03 DIAGNOSIS — I50.30 DIASTOLIC HEART FAILURE, UNSPECIFIED HF CHRONICITY: ICD-10-CM

## 2019-01-03 DIAGNOSIS — I27.21 PULMONARY HYPERTENSION SECONDARY TO RAISED PULMONARY VASCULAR RESISTANCE: Primary | ICD-10-CM

## 2019-01-03 DIAGNOSIS — I10 HYPERTENSION, ESSENTIAL: ICD-10-CM

## 2019-01-03 DIAGNOSIS — Z86.718 HISTORY OF DVT OF LOWER EXTREMITY: ICD-10-CM

## 2019-01-03 RX ORDER — AMLODIPINE BESYLATE 5 MG/1
5 TABLET ORAL DAILY
Qty: 90 TABLET | Refills: 3 | Status: SHIPPED | OUTPATIENT
Start: 2019-01-03 | End: 2021-01-06

## 2019-01-03 RX ORDER — HYDROCHLOROTHIAZIDE 25 MG/1
25 TABLET ORAL
Qty: 90 TABLET | Refills: 3 | Status: SHIPPED | OUTPATIENT
Start: 2019-01-04

## 2019-01-03 RX ORDER — ALBUTEROL SULFATE 90 UG/1
AEROSOL, METERED RESPIRATORY (INHALATION)
Qty: 3 INHALER | Refills: 3 | Status: SHIPPED | OUTPATIENT
Start: 2019-01-03 | End: 2021-01-06

## 2019-01-04 DIAGNOSIS — I10 HYPERTENSION, ESSENTIAL: ICD-10-CM

## 2019-01-04 DIAGNOSIS — I27.21 PULMONARY HYPERTENSION SECONDARY TO RAISED PULMONARY VASCULAR RESISTANCE: ICD-10-CM

## 2019-01-04 DIAGNOSIS — I50.30 DIASTOLIC HEART FAILURE, UNSPECIFIED HF CHRONICITY: ICD-10-CM

## 2019-01-04 DIAGNOSIS — Z86.718 HISTORY OF DVT OF LOWER EXTREMITY: ICD-10-CM

## 2019-01-04 RX ORDER — ACEBUTOLOL HYDROCHLORIDE 200 MG/1
200 CAPSULE ORAL 2 TIMES DAILY
Qty: 180 CAPSULE | Refills: 3 | Status: SHIPPED | OUTPATIENT
Start: 2019-01-04

## 2019-01-07 DIAGNOSIS — J44.9 COPD (CHRONIC OBSTRUCTIVE PULMONARY DISEASE): Primary | ICD-10-CM

## 2019-01-09 ENCOUNTER — HOSPITAL ENCOUNTER (OUTPATIENT)
Dept: PULMONOLOGY | Facility: OTHER | Age: 79
Discharge: HOME | End: 2019-01-09
Attending: INTERNAL MEDICINE
Payer: MEDICARE

## 2019-01-09 VITALS — WEIGHT: 218.06 LBS | BODY MASS INDEX: 37.43 KG/M2

## 2019-01-09 DIAGNOSIS — J44.9 COPD (CHRONIC OBSTRUCTIVE PULMONARY DISEASE): ICD-10-CM

## 2019-01-09 PROCEDURE — G0238 OTH RESP PROC, INDIV: HCPCS

## 2019-01-09 PROCEDURE — G0237 THERAPEUTIC PROCD STRG ENDUR: HCPCS

## 2019-01-09 NOTE — PROGRESS NOTES
[facility]  Pulmonary Rehab  Session Summary    SUMMARY     Session Data  Session Number: 5  Time In: 1330  Time Out: 1500  Weight: 98.9 kg (218 lb 0.6 oz)  Target Heart Rate Zone: Minimum: 85 bpm  Target Heart Rate Zone: Maximum: 114 bpm  Patient Motivation: Adequate  Patient Effort: Adequate      Pre Exercise Vitals  SpO2: 95 %  Supplemental O2?: No  Pulse: 71  BP: 147/75  Respiration Rate: 18 per minute  Rating of Perceived Exertion (RPE) Scale: 2      During Exercise Vitals  SpO2: 96 %  Pulse: 73  Rating of Perceived Exertion (RPE) Scale: 2      Post Exercise Vitals  SpO2: 94 %  Supplemental O2?: No  Pulse: 77  BP: 136/69  Respiration Rate: 18 per minute  Rating of Perceived Exertion (RPE) Scale: 2       Modality  Modality: Leg Free Weights            Arm Ergometer  Time: 6 minutes  RPM: 35 RPMs  Level: 1            Nustep  Time: 15 minutes  Steps: 56  Load: 1            Treadmill  Time: 1 minutes  MPH: 1 MPH  stGstrstastdstest:st st1st Biceps  lbs: 3 lbs  Sets: 3  Reps: 10            Triceps  lbs: 3 lbs  Sets: 3  Reps: 10          Education        Therapist Notes Patient arrived on time. Blood pressure was in normal range. RPE 2. No complaints.Overall patient done well.Alison Shields, CRT

## 2019-01-11 ENCOUNTER — HOSPITAL ENCOUNTER (OUTPATIENT)
Dept: PULMONOLOGY | Facility: OTHER | Age: 79
Discharge: HOME | End: 2019-01-11
Attending: INTERNAL MEDICINE
Payer: MEDICARE

## 2019-01-11 VITALS — WEIGHT: 217.63 LBS | BODY MASS INDEX: 37.35 KG/M2

## 2019-01-11 PROCEDURE — G0238 OTH RESP PROC, INDIV: HCPCS

## 2019-01-11 PROCEDURE — G0237 THERAPEUTIC PROCD STRG ENDUR: HCPCS

## 2019-01-11 NOTE — PROGRESS NOTES
[facility]  Pulmonary Rehab  Session Summary    SUMMARY     Session Data  Session Number: 6  Time In: 1300  Time Out: 1430  Weight: 98.7 kg (217 lb 9.5 oz)  Target Heart Rate Zone: Minimum: 85 bpm  Target Heart Rate Zone: Maximum: 114 bpm  Patient Motivation: Good  Patient Effort: Good      Pre Exercise Vitals  SpO2: 97 %  Pulse: 87  BP: 138/79  Respiration Rate: 20 per minute      During Exercise Vitals  SpO2: 97 %  Respiration Rate: 20 per minute      Post Exercise Vitals  SpO2: 95 %  Pulse: 68  BP: 131/65  Respiration Rate: 20 per minute  Rating of Perceived Exertion (RPE) Scale: 2       Modality  Modality: Leg Free Weights      Arm Ergometer  Time: 10 minutes  RPM: 34 RPMs(.80 distance)  Level: 1      Nustep  Time: 30 minutes  Steps: 55(1.8 distance)  Load: 1      Biceps  lbs: 3 lbs  Sets: 3  Reps: 10      Chest  lbs: 3 lbs  Sets: 3  Reps: 10      Triceps  lbs: 3 lbs  Sets: 3  Reps: 10      Lower Body  lbs: 3 lbs  Sets: 2  Reps: 10    Education    Therapist Notes Pt arrived to therapy session and completed exercise routine without complications.  Pt appears to be in no apparent respiratory distress.Gabe Tyler, CRT

## 2019-01-14 ENCOUNTER — HOSPITAL ENCOUNTER (OUTPATIENT)
Dept: PULMONOLOGY | Facility: OTHER | Age: 79
Discharge: HOME | End: 2019-01-14
Attending: INTERNAL MEDICINE
Payer: MEDICARE

## 2019-01-14 VITALS — WEIGHT: 220.44 LBS | BODY MASS INDEX: 37.84 KG/M2

## 2019-01-14 PROCEDURE — G0237 THERAPEUTIC PROCD STRG ENDUR: HCPCS

## 2019-01-14 PROCEDURE — G0238 OTH RESP PROC, INDIV: HCPCS

## 2019-01-14 NOTE — PROGRESS NOTES
[facility]  Pulmonary Rehab  Session Summary    SUMMARY     Session Data  Session Number: 7  Time In: 1330  Time Out: 1500  Weight: 100 kg (220 lb 7.4 oz)  Target Heart Rate Zone: Minimum: 85 bpm  Target Heart Rate Zone: Maximum: 114 bpm  Patient Motivation: Adequate  Patient Effort: Adequate      Pre Exercise Vitals  SpO2: 97 %  Supplemental O2?: No  Pulse: 84  BP: 148/72  Respiration Rate: 18 per minute  Rating of Perceived Exertion (RPE) Scale: 3      During Exercise Vitals  SpO2: 98 %  Pulse: 82  Rating of Perceived Exertion (RPE) Scale: 3      Post Exercise Vitals  SpO2: 95 %  Pulse: 84  BP: 139/70  Respiration Rate: 20 per minute  Rating of Perceived Exertion (RPE) Scale: 3       Modality  Modality: Leg Free Weights            Arm Ergometer  Time: 10 minutes  RPM: 33 RPMs  Level: 2            Nustep  Time: 20 minutes  Steps: 52  Load: 2                        Biceps  lbs: 3 lbs  Sets: 3  Reps: 10            Triceps  lbs: 3 lbs  Sets: 3  Reps: 10      Lower Body  lbs: 3 lbs  Sets: 2  Reps: 10    Education        Therapist Notes Patient arrived on time with a good attitude. Blood pressure was elevated on arrival, before leaving pressure decreased. RPE 2-3. Overall patient done well.Alison Shields, CRT

## 2019-01-16 ENCOUNTER — HOSPITAL ENCOUNTER (OUTPATIENT)
Dept: PULMONOLOGY | Facility: OTHER | Age: 79
Discharge: HOME | End: 2019-01-16
Attending: INTERNAL MEDICINE
Payer: MEDICARE

## 2019-01-16 VITALS — WEIGHT: 218.25 LBS | BODY MASS INDEX: 37.46 KG/M2

## 2019-01-16 PROCEDURE — G0238 OTH RESP PROC, INDIV: HCPCS

## 2019-01-16 PROCEDURE — G0237 THERAPEUTIC PROCD STRG ENDUR: HCPCS

## 2019-01-16 NOTE — PROGRESS NOTES
[facility]  Pulmonary Rehab  Session Summary    SUMMARY     Session Data  Session Number: 8  Time In: 1330  Time Out: 1500  Weight: 99 kg (218 lb 4.1 oz)  Target Heart Rate Zone: Minimum: 85 bpm  Target Heart Rate Zone: Maximum: 114 bpm  Patient Motivation: Adequate  Patient Effort: Adequate      Pre Exercise Vitals  SpO2: 95 %  Pulse: 81  BP: 150/72  Respiration Rate: 18 per minute  Rating of Perceived Exertion (RPE) Scale: 3      During Exercise Vitals  SpO2: 95 %  Pulse: 80  Rating of Perceived Exertion (RPE) Scale: 3      Post Exercise Vitals  SpO2: 94 %  Pulse: 81  Rating of Perceived Exertion (RPE) Scale: 3       Modality  Modality: Nustep            Arm Ergometer  Time: 16 minutes  RPM: 30 RPMs  Level: 2            Nustep  Time: 10 minutes  Steps: 51  Load: 2                                              Education        Therapist Notes Patient arrived on time with a good attitude. Blood pressure was slightly elevated on arrival, before leaving patient blood pressure decreased. RPE 3. Overall patient done well.Alison Shields, CRT

## 2019-02-01 ENCOUNTER — CLINICAL SUPPORT (OUTPATIENT)
Dept: CARDIOLOGY | Facility: CLINIC | Age: 79
End: 2019-02-01
Payer: MEDICARE

## 2019-02-01 DIAGNOSIS — Z95.0 CARDIAC PACEMAKER IN SITU: Primary | ICD-10-CM

## 2019-02-01 PROCEDURE — 93293 PR TRANSTELEPHONIC RHYTHM STRIP PACEMAKER EVAL: ICD-10-PCS | Mod: S$GLB,,, | Performed by: INTERNAL MEDICINE

## 2019-02-01 PROCEDURE — 93293 PM PHONE R-STRIP DEVICE EVAL: CPT | Mod: S$GLB,,, | Performed by: INTERNAL MEDICINE

## 2019-02-01 PROCEDURE — 93296 REM INTERROG EVL PM/IDS: CPT | Mod: S$GLB,,, | Performed by: INTERNAL MEDICINE

## 2019-02-01 PROCEDURE — 93296 PR INTERROG EVAL, REMOTE, UP TO 90 DAYS, PACER/DEFIB,TECH REVIEW: ICD-10-PCS | Mod: S$GLB,,, | Performed by: INTERNAL MEDICINE

## 2019-03-26 ENCOUNTER — OFFICE VISIT (OUTPATIENT)
Dept: CARDIOLOGY | Facility: CLINIC | Age: 79
End: 2019-03-26
Attending: INTERNAL MEDICINE
Payer: MEDICARE

## 2019-03-26 VITALS
HEART RATE: 91 BPM | BODY MASS INDEX: 36.37 KG/M2 | HEIGHT: 64 IN | WEIGHT: 213 LBS | SYSTOLIC BLOOD PRESSURE: 169 MMHG | DIASTOLIC BLOOD PRESSURE: 77 MMHG

## 2019-03-26 DIAGNOSIS — I50.32 CHRONIC DIASTOLIC HEART FAILURE: ICD-10-CM

## 2019-03-26 DIAGNOSIS — Z86.711 HISTORY OF PULMONARY EMBOLISM: Primary | ICD-10-CM

## 2019-03-26 DIAGNOSIS — E66.09 OBESITY DUE TO EXCESS CALORIES WITH SERIOUS COMORBIDITY, UNSPECIFIED CLASSIFICATION: ICD-10-CM

## 2019-03-26 DIAGNOSIS — Z95.0 CARDIAC PACEMAKER IN SITU: ICD-10-CM

## 2019-03-26 DIAGNOSIS — J44.9 CHRONIC OBSTRUCTIVE PULMONARY DISEASE, UNSPECIFIED COPD TYPE: ICD-10-CM

## 2019-03-26 DIAGNOSIS — I44.1 SECOND DEGREE HEART BLOCK: ICD-10-CM

## 2019-03-26 DIAGNOSIS — G47.33 OSA (OBSTRUCTIVE SLEEP APNEA): ICD-10-CM

## 2019-03-26 PROCEDURE — 99214 OFFICE O/P EST MOD 30 MIN: CPT | Mod: S$GLB,,, | Performed by: INTERNAL MEDICINE

## 2019-03-26 PROCEDURE — 3077F PR MOST RECENT SYSTOLIC BLOOD PRESSURE >= 140 MM HG: ICD-10-PCS | Mod: CPTII,S$GLB,, | Performed by: INTERNAL MEDICINE

## 2019-03-26 PROCEDURE — 99214 PR OFFICE/OUTPT VISIT, EST, LEVL IV, 30-39 MIN: ICD-10-PCS | Mod: S$GLB,,, | Performed by: INTERNAL MEDICINE

## 2019-03-26 PROCEDURE — 93000 PR ELECTROCARDIOGRAM, COMPLETE: ICD-10-PCS | Mod: S$GLB,,, | Performed by: INTERNAL MEDICINE

## 2019-03-26 PROCEDURE — 3078F DIAST BP <80 MM HG: CPT | Mod: CPTII,S$GLB,, | Performed by: INTERNAL MEDICINE

## 2019-03-26 PROCEDURE — 3077F SYST BP >= 140 MM HG: CPT | Mod: CPTII,S$GLB,, | Performed by: INTERNAL MEDICINE

## 2019-03-26 PROCEDURE — 3078F PR MOST RECENT DIASTOLIC BLOOD PRESSURE < 80 MM HG: ICD-10-PCS | Mod: CPTII,S$GLB,, | Performed by: INTERNAL MEDICINE

## 2019-03-26 PROCEDURE — 93000 ELECTROCARDIOGRAM COMPLETE: CPT | Mod: S$GLB,,, | Performed by: INTERNAL MEDICINE

## 2019-03-26 NOTE — PROGRESS NOTES
Subjective:    Patient ID:  Yun Silva is a 79 y.o. female     HPI   Here for follow-up of second-degree heart block, cardiac pacemaker in situ, chronic obstructive lung disease, chronic diastolic heart failure, obesity, obstructive sleep apnea, history of pulmonary embolism.    Since I have had the pacemaker, my breathing is better however I still get a little short of breath on getting around.    Current Outpatient Medications   Medication Sig    acebutolol (SECTRAL) 200 MG capsule Take 1 capsule (200 mg total) by mouth 2 (two) times daily.    albuterol (PROVENTIL/VENTOLIN HFA) 90 mcg/actuation inhaler 2 puffs Q 4h prn wheezing or shortness of breath.    alendronate (FOSAMAX) 70 MG tablet Take 1 tablet (70 mg total) by mouth every 7 days. For osteopenia.    amLODIPine (NORVASC) 5 MG tablet Take 1 tablet (5 mg total) by mouth once daily.    aspirin (ECOTRIN) 81 MG EC tablet Take 162 mg by mouth.     cranberry fruit concentrate (AZO CRANBERRY ORAL) Take by mouth once daily.    cycloSPORINE (RESTASIS) 0.05 % ophthalmic emulsion Place 1 drop into both eyes 2 (two) times daily.     fluticasone/umeclidin/vilanter (TRELEGY ELLIPTA INHL) Inhale into the lungs as needed.     gabapentin (NEURONTIN) 800 MG tablet Take 1 tablet (800 mg total) by mouth 3 (three) times daily.    hydrALAZINE (APRESOLINE) 25 MG tablet Take 2 tablets by mouth 4 times daily    hydroCHLOROthiazide (HYDRODIURIL) 25 MG tablet Take 1 tablet (25 mg total) by mouth every Mon, Wed, Fri.    INCRUSE ELLIPTA 62.5 mcg/actuation DsDv     Lactobacillus rhamnosus GG (CULTURELLE) 10 billion cell capsule Take 1 capsule by mouth once daily.    MULTIVITAMIN W-MINERALS/LUTEIN (CENTRUM SILVER ORAL) Take by mouth.    mupirocin calcium 2% (BACTROBAN) 2 % cream Apply topically 3 (three) times daily.    rivaroxaban (XARELTO) 15 mg Tab TAKE 1 TABLET BY MOUTH DAILY WITH DINNER OR EVENING MEAL.    traZODone (DESYREL) 100 MG tablet     trospium  "(SANCTURA XR) 60 mg Cp24 capsule Take 1 capsule (60 mg total) by mouth once daily. For bladder    valsartan (DIOVAN) 160 MG tablet Take 1 tablet (160 mg total) by mouth once daily.    valsartan (DIOVAN) 160 MG tablet TAKE 1 TABLET BY MOUTH EVERY DAY     No current facility-administered medications for this visit.          Review of Systems   Constitution: Negative for chills, decreased appetite, fever, weight gain and weight loss.   HENT: Negative for congestion, hearing loss and sore throat.    Eyes: Negative for blurred vision, double vision and visual disturbance.   Cardiovascular: Positive for dyspnea on exertion. Negative for chest pain, claudication, leg swelling, palpitations and syncope.   Respiratory: Negative for cough, hemoptysis, shortness of breath, sputum production and wheezing.    Endocrine: Negative for cold intolerance and heat intolerance.   Hematologic/Lymphatic: Negative for bleeding problem. Does not bruise/bleed easily.   Skin: Negative for color change, dry skin, flushing and itching.   Musculoskeletal: Negative for back pain, joint pain and myalgias.   Gastrointestinal: Negative for abdominal pain, anorexia, constipation, diarrhea, dysphagia, nausea and vomiting.        No bleeding per rectum   Genitourinary: Negative for dysuria, flank pain, frequency, hematuria and nocturia.   Neurological: Negative for dizziness, headaches, light-headedness, loss of balance, seizures and tremors.   Psychiatric/Behavioral: Negative for altered mental status and depression.         Vitals:    03/26/19 1251   BP: (!) 169/77   Pulse: 91   Weight: 96.6 kg (213 lb)   Height: 5' 4" (1.626 m)    Blood pressure recheck 136/72.    Objective:    Physical Exam   Constitutional: She is oriented to person, place, and time. She appears well-developed and well-nourished.   HENT:   Head: Normocephalic and atraumatic.   Nose: Nose normal.   Mouth/Throat: Oropharynx is clear and moist.   Eyes: Pupils are equal, round, and " reactive to light. Conjunctivae and EOM are normal.   Neck: Neck supple. No tracheal deviation present. No thyromegaly present.   Cardiovascular: Normal rate, regular rhythm and intact distal pulses. Exam reveals no gallop and no friction rub.   No murmur heard.  Pulmonary/Chest: No respiratory distress. She has no wheezes. She has no rales. She exhibits no tenderness.   Abdominal: Soft. Bowel sounds are normal. She exhibits no distension and no mass. There is no tenderness. There is no rebound and no guarding.   Musculoskeletal: Normal range of motion. She exhibits edema.   Lymphadenopathy:     She has no cervical adenopathy.   Neurological: She is alert and oriented to person, place, and time.   Skin: Skin is warm and dry.   Psychiatric: Her behavior is normal.         Assessment:       1. History of pulmonary embolism    2. Second degree heart block    3. Cardiac pacemaker in situ    4. THERESE (obstructive sleep apnea)    5. Obesity due to excess calories with serious comorbidity, unspecified classification    6. Chronic diastolic heart failure    7. Chronic obstructive pulmonary disease, unspecified COPD type         Plan:       Stable from a cardiovascular standpoint.  Continue the same pharmacological regimen.

## 2019-05-07 ENCOUNTER — CLINICAL SUPPORT (OUTPATIENT)
Dept: CARDIOLOGY | Facility: CLINIC | Age: 79
End: 2019-05-07
Payer: MEDICARE

## 2019-05-07 DIAGNOSIS — Z95.0 CARDIAC PACEMAKER IN SITU: Primary | ICD-10-CM

## 2019-05-07 PROCEDURE — 93293 PR TRANSTELEPHONIC RHYTHM STRIP PACEMAKER EVAL: ICD-10-PCS | Mod: S$GLB,,, | Performed by: INTERNAL MEDICINE

## 2019-05-07 PROCEDURE — 93293 PM PHONE R-STRIP DEVICE EVAL: CPT | Mod: S$GLB,,, | Performed by: INTERNAL MEDICINE

## 2019-05-07 PROCEDURE — 93296 REM INTERROG EVL PM/IDS: CPT | Mod: S$GLB,,, | Performed by: INTERNAL MEDICINE

## 2019-05-07 PROCEDURE — 93296 PR INTERROG EVAL, REMOTE, UP TO 90 DAYS, PACER/DEFIB,TECH REVIEW: ICD-10-PCS | Mod: S$GLB,,, | Performed by: INTERNAL MEDICINE

## 2020-01-14 ENCOUNTER — TELEPHONE (OUTPATIENT)
Dept: SLEEP MEDICINE | Facility: CLINIC | Age: 80
End: 2020-01-14

## 2020-01-14 NOTE — TELEPHONE ENCOUNTER
----- Message from Maritza Basurto sent at 1/13/2020  1:16 PM CST -----  Contact: patricia nam   Name of Who is Calling: Yun Silva      What is the request in detail: Adalberto would like to have pt scheduled for a new pt appointment. Referral in system. Please contact to further discuss and advise.      Can the clinic reply by [facility]: n    What Number to Call Back if not in [facility]: [phone] or [phone]

## 2020-01-29 ENCOUNTER — TELEPHONE (OUTPATIENT)
Dept: NEUROLOGY | Facility: CLINIC | Age: 80
End: 2020-01-29

## 2020-01-29 ENCOUNTER — LAB VISIT (OUTPATIENT)
Dept: LAB | Facility: OTHER | Age: 80
End: 2020-01-29
Attending: PSYCHIATRY & NEUROLOGY
Payer: MEDICARE

## 2020-01-29 ENCOUNTER — OFFICE VISIT (OUTPATIENT)
Dept: NEUROLOGY | Facility: CLINIC | Age: 80
End: 2020-01-29
Attending: INTERNAL MEDICINE
Payer: MEDICARE

## 2020-01-29 VITALS
TEMPERATURE: 98 F | BODY MASS INDEX: 38.9 KG/M2 | HEIGHT: 63 IN | SYSTOLIC BLOOD PRESSURE: 129 MMHG | DIASTOLIC BLOOD PRESSURE: 62 MMHG | WEIGHT: 219.56 LBS | HEART RATE: 69 BPM

## 2020-01-29 DIAGNOSIS — G25.0 ESSENTIAL TREMOR: ICD-10-CM

## 2020-01-29 DIAGNOSIS — D32.9 MENINGIOMA: ICD-10-CM

## 2020-01-29 DIAGNOSIS — G60.3 IDIOPATHIC PROGRESSIVE POLYNEUROPATHY: ICD-10-CM

## 2020-01-29 DIAGNOSIS — Z11.4 ENCOUNTER FOR SCREENING FOR HUMAN IMMUNODEFICIENCY VIRUS (HIV): ICD-10-CM

## 2020-01-29 DIAGNOSIS — G60.3 IDIOPATHIC PROGRESSIVE POLYNEUROPATHY: Primary | ICD-10-CM

## 2020-01-29 LAB
ERYTHROCYTE [SEDIMENTATION RATE] IN BLOOD: 34 MM/HR (ref 0–20)
RHEUMATOID FACT SERPL-ACNC: 36 IU/ML (ref 0–15)
VIT B12 SERPL-MCNC: 695 PG/ML (ref 210–950)

## 2020-01-29 PROCEDURE — 99204 PR OFFICE/OUTPT VISIT, NEW, LEVL IV, 45-59 MIN: ICD-10-PCS | Mod: S$GLB,,, | Performed by: PSYCHIATRY & NEUROLOGY

## 2020-01-29 PROCEDURE — 3074F PR MOST RECENT SYSTOLIC BLOOD PRESSURE < 130 MM HG: ICD-10-PCS | Mod: CPTII,S$GLB,, | Performed by: PSYCHIATRY & NEUROLOGY

## 2020-01-29 PROCEDURE — 1125F AMNT PAIN NOTED PAIN PRSNT: CPT | Mod: S$GLB,,, | Performed by: PSYCHIATRY & NEUROLOGY

## 2020-01-29 PROCEDURE — 84165 PATHOLOGIST INTERPRETATION SPE: ICD-10-PCS | Mod: 26,,, | Performed by: PATHOLOGY

## 2020-01-29 PROCEDURE — 86235 NUCLEAR ANTIGEN ANTIBODY: CPT

## 2020-01-29 PROCEDURE — 99204 OFFICE O/P NEW MOD 45 MIN: CPT | Mod: S$GLB,,, | Performed by: PSYCHIATRY & NEUROLOGY

## 2020-01-29 PROCEDURE — 85651 RBC SED RATE NONAUTOMATED: CPT

## 2020-01-29 PROCEDURE — 3074F SYST BP LT 130 MM HG: CPT | Mod: CPTII,S$GLB,, | Performed by: PSYCHIATRY & NEUROLOGY

## 2020-01-29 PROCEDURE — 1159F MED LIST DOCD IN RCRD: CPT | Mod: S$GLB,,, | Performed by: PSYCHIATRY & NEUROLOGY

## 2020-01-29 PROCEDURE — 84425 ASSAY OF VITAMIN B-1: CPT

## 2020-01-29 PROCEDURE — 84165 PROTEIN E-PHORESIS SERUM: CPT

## 2020-01-29 PROCEDURE — 3078F DIAST BP <80 MM HG: CPT | Mod: CPTII,S$GLB,, | Performed by: PSYCHIATRY & NEUROLOGY

## 2020-01-29 PROCEDURE — 3078F PR MOST RECENT DIASTOLIC BLOOD PRESSURE < 80 MM HG: ICD-10-PCS | Mod: CPTII,S$GLB,, | Performed by: PSYCHIATRY & NEUROLOGY

## 2020-01-29 PROCEDURE — 86431 RHEUMATOID FACTOR QUANT: CPT

## 2020-01-29 PROCEDURE — 1159F PR MEDICATION LIST DOCUMENTED IN MEDICAL RECORD: ICD-10-PCS | Mod: S$GLB,,, | Performed by: PSYCHIATRY & NEUROLOGY

## 2020-01-29 PROCEDURE — 86334 IMMUNOFIX E-PHORESIS SERUM: CPT

## 2020-01-29 PROCEDURE — 84165 PROTEIN E-PHORESIS SERUM: CPT | Mod: 26,,, | Performed by: PATHOLOGY

## 2020-01-29 PROCEDURE — 99999 PR PBB SHADOW E&M-EST. PATIENT-LVL III: ICD-10-PCS | Mod: PBBFAC,,, | Performed by: PSYCHIATRY & NEUROLOGY

## 2020-01-29 PROCEDURE — 1125F PR PAIN SEVERITY QUANTIFIED, PAIN PRESENT: ICD-10-PCS | Mod: S$GLB,,, | Performed by: PSYCHIATRY & NEUROLOGY

## 2020-01-29 PROCEDURE — 86038 ANTINUCLEAR ANTIBODIES: CPT

## 2020-01-29 PROCEDURE — 1101F PR PT FALLS ASSESS DOC 0-1 FALLS W/OUT INJ PAST YR: ICD-10-PCS | Mod: CPTII,S$GLB,, | Performed by: PSYCHIATRY & NEUROLOGY

## 2020-01-29 PROCEDURE — 99999 PR PBB SHADOW E&M-EST. PATIENT-LVL III: CPT | Mod: PBBFAC,,, | Performed by: PSYCHIATRY & NEUROLOGY

## 2020-01-29 PROCEDURE — 86334 PATHOLOGIST INTERPRETATION IFE: ICD-10-PCS | Mod: 26,,, | Performed by: PATHOLOGY

## 2020-01-29 PROCEDURE — 86592 SYPHILIS TEST NON-TREP QUAL: CPT

## 2020-01-29 PROCEDURE — 82607 VITAMIN B-12: CPT

## 2020-01-29 PROCEDURE — 36415 COLL VENOUS BLD VENIPUNCTURE: CPT

## 2020-01-29 PROCEDURE — 86618 LYME DISEASE ANTIBODY: CPT

## 2020-01-29 PROCEDURE — 1101F PT FALLS ASSESS-DOCD LE1/YR: CPT | Mod: CPTII,S$GLB,, | Performed by: PSYCHIATRY & NEUROLOGY

## 2020-01-29 PROCEDURE — 86334 IMMUNOFIX E-PHORESIS SERUM: CPT | Mod: 26,,, | Performed by: PATHOLOGY

## 2020-01-29 NOTE — LETTER
January 29, 2020      Yoni Jennings MD  [address]  [address]  [facility] [address]           [facility] [place] [address]  [address]  [facility] [address]  Phone: [phone]  Fax: [phone]          Patient: Yun Silva   MR Number: [id number]   YOB: 1940   Date of Visit: 1/29/2020       Dear Dr. Yoni Jennings:    Thank you for referring Yun Silva to me for evaluation. Attached you will find relevant portions of my assessment and plan of care.    If you have questions, please do not hesitate to call me. I look forward to following Yun Silva along with you.    Sincerely,    Leticia Francisco MD    Enclosure  CC:  No Recipients    If you would like to receive this communication electronically, please contact [email or web address] or [phone] to request more information on Rapid[employer] Link access.    For providers and/or their staff who would like to refer a patient to [facility], please contact us through our one-stop-shop provider referral line, [facility], at [phone].    If you feel you have received this communication in error or would no longer like to receive these types of communications, please e-mail [email or web address]

## 2020-01-29 NOTE — PATIENT INSTRUCTIONS
Thank you for coming.  - Weighted spoons for tremor   - EMG/ NCS to evaluate neuropathy   -Start Alpha-Lipoeid acid 600mg daily  - Increase Gabapentin 900 mg TID   -Genetic testing for hATTR genetic testing   - Blood work today for neuropathy  - return to clinic in 6 months

## 2020-01-29 NOTE — PROGRESS NOTES
NEUROLOGY  Outpatient Initial Clinic visit note    [address] [address]  [phone] (office) / [phone] ( (fax)    Patient Name:  Yun Silva  :  1940  MR #:  [id number]  Acct #:  [id number]    Date of Neurology Visit: 2020  Name of Neurologist: Leticia Francisco MD    Other Physicians:  Yoni Jennings MD (Primary Care Physician); Yoni Jennings MD (Referring)      Chief Complaint: Peripheral Neuropathy      History of Present Illness (HPI):  Yun Silva is a 80 y.o. female with pmh of DVT, PE, CHF, bladder incontinence, meningioma ( monitored with yearly MRIs), OA presents to the Neurology clinic for evaluation and management of neuropathy.     She has seen Dr. Dodd for neuropathy. She was started on medication(Gabapentin) which she stopped but her symptoms got worse. She thinks because of this the medication is helping.     Her feet hurt, tingle, and are very sensitive. This has been going on for 10 years. She does not remember whether it was sudden or gradual in onset when it began. Symptoms are from knees down. The symptoms are better when she puts on stockings. Symptoms are worse in the afternoon. She takes Gabapentin 800mg TID.     She has chronic bladder incontinence, she feels like she has to urinate but she has minimal urine when she pees. Denies urination with coughing. She states this started few years ago. Etiology of incontinence is unclear and is managed by a Urologist.     She has some numbness/ tingling in her fingers. She has had CTS release surgery in both her hands after which the weakness in her hands has improved but she continues to have numbness and tingling in her fingers.     Endorses neck pain but does not shoot down.     No back pain.     Duration: 10 years   Location: feet  Up to knees and fingers   Intensity: moderate to severe   Aggravating factors: worse in the afternoons   Relieving factors: somewhat  better with Gabapentin   Frequency: daily   Timing: afternoons   Associated symptoms: numbness       Treatment to date:  CTS release surgery bilaterally  Lyrica with no benefit  Gabapentin 800mg TID , on currently with some benefit       Review of Systems:    She has sleep apnea and uses autoPAP at night     General: Weight gain: Yes, Weight Loss: No, Fatigue: No,   Fever: No, Chills: No, Night Sweats: No, Insomnia: No, Excessive sleeping: No   Respiratory:  Cough: No, Shortness of Breath: Yes,   Wheezing: No, Excessive Snoring: No, Coughing up blood: No  Endocrine: Heat Intolerance: No, Cold Intolerance: No,   Excessive Thirst: No, Excessive Hunger: No,   Eyes:  Blurred Vision: No, Double Vision: No,   Light Sensitivity: No, Eye pain: No  Musculoskeletal: Muscle Aches/Pain: Yes, Joint Pain/Swelling: No, Muscle Cramps: Yes, Muscle Weakness: Yes, Neck Pain: Yes, Back Pain: No   Neurological: Difficulty Walking/Falls: Yes, Headache Migraine: No, Dizziness/Vertigo: No, Fainting: No, Difficulty with Speech: No, Weakness: Yes, Tingling/Numbness: Yes, Tremors: Yes, Memory Problems: Yes, Seizures: No, Difficulty Swallowing: No, Altered Taste: No.  Cardiovascular: Chest Pain: No, Shortness of Breath: Yes,   Palpitations: No,  Gastrointestinal: Nausea/Vomiting: No, Constipation: Yes, Diarrhea: No, Bloody Stools: No   Psych/Cog:  Depression: No, Anxiety: No, Hallucinations: No, Problems Concentrating: No  : Frequent Urination: Yes, Incontinence: Yes, Blood of Urine: No, Urinary Infections: Yes,   ENT:Hearing Loss: Yes, Earache: No, Ringing in Ears: Yes,   Facial Pain: No, Chronic Congestion: No   Immune: Seasonal Allergies: No, Hives and/or Rashes: No  The remainder of the review of twelve body systems was reviewed and normal.    Past Medical, Surgical, Family & Social History:   Reviewed and updated.    Home Medications:     Current Outpatient Medications:     acebutolol (SECTRAL) 200 MG capsule, Take 1 capsule (200 mg  total) by mouth 2 (two) times daily., Disp: 180 capsule, Rfl: 3    apixaban (ELIQUIS) 5 mg Tab, Take 5 mg by mouth once daily., Disp: , Rfl:     aspirin (ECOTRIN) 81 MG EC tablet, Take 162 mg by mouth. , Disp: , Rfl:     cranberry fruit concentrate (AZO CRANBERRY ORAL), Take by mouth once daily., Disp: , Rfl:     gabapentin (NEURONTIN) 800 MG tablet, TAKE 1 TABLET THREE TIMES DAILY, Disp: 270 tablet, Rfl: 3    albuterol (PROVENTIL/VENTOLIN HFA) 90 mcg/actuation inhaler, 2 puffs Q 4h prn wheezing or shortness of breath. (Patient not taking: Reported on 1/29/2020), Disp: 3 Inhaler, Rfl: 3    alendronate (FOSAMAX) 70 MG tablet, Take 1 tablet (70 mg total) by mouth every 7 days. For osteopenia. (Patient not taking: Reported on 1/29/2020), Disp: 12 tablet, Rfl: 3    amLODIPine (NORVASC) 5 MG tablet, Take 1 tablet (5 mg total) by mouth once daily. (Patient not taking: Reported on 1/29/2020), Disp: 90 tablet, Rfl: 3    cycloSPORINE (RESTASIS) 0.05 % ophthalmic emulsion, Place 1 drop into both eyes 2 (two) times daily. , Disp: , Rfl:     fluticasone/umeclidin/vilanter (TRELEGY ELLIPTA INHL), Inhale into the lungs as needed. , Disp: , Rfl:     hydrALAZINE (APRESOLINE) 25 MG tablet, Take 2 tablets by mouth 4 times daily, Disp: 720 tablet, Rfl: 3    hydroCHLOROthiazide (HYDRODIURIL) 25 MG tablet, Take 1 tablet (25 mg total) by mouth every Mon, Wed, Fri., Disp: 90 tablet, Rfl: 3    INCRUSE ELLIPTA 62.5 mcg/actuation DsDv, , Disp: , Rfl:     Lactobacillus rhamnosus GG (CULTURELLE) 10 billion cell capsule, Take 1 capsule by mouth once daily., Disp: , Rfl:     metoprolol succinate (TOPROL-XL) 50 MG 24 hr tablet, Take 50 mg by mouth once daily., Disp: , Rfl:     multivit-min-iron-folic-vit K1 (CENTRUM CHEWABLES) 8 mg-400 mcg- 10 mcg Chew, Take by mouth., Disp: , Rfl:     MULTIVITAMIN W-MINERALS/LUTEIN (CENTRUM SILVER ORAL), Take by mouth., Disp: , Rfl:     mupirocin calcium 2% (BACTROBAN) 2 % cream, Apply topically  "3 (three) times daily., Disp: 1 Tube, Rfl: 1    NIFEdipine (PROCARDIA-XL) 30 MG (OSM) 24 hr tablet, Take 30 mg by mouth once daily., Disp: , Rfl:     rivaroxaban (XARELTO) 15 mg Tab, TAKE 1 TABLET BY MOUTH DAILY WITH DINNER OR EVENING MEAL., Disp: 90 tablet, Rfl: 3    traZODone (DESYREL) 100 MG tablet, , Disp: , Rfl:     trospium (SANCTURA XR) 60 mg Cp24 capsule, TAKE 1 CAPSULE (60 MG TOTAL) BY MOUTH ONCE DAILY. FOR BLADDER, Disp: 90 capsule, Rfl: 3    valsartan (DIOVAN) 160 MG tablet, TAKE 1 TABLET BY MOUTH EVERY DAY, Disp: 90 tablet, Rfl: 3    Physical Examination:  /62   Pulse 69   Temp 98.4 °F (36.9 °C)   Ht 5' 3" (1.6 m)   Wt 99.6 kg (219 lb 9.3 oz)   BMI 38.90 kg/m²     GENERAL:  General appearance: Well, non-toxic appearing.  No apparent distress.  Fundi exam: normal.  Extremities: 1+ pitting pedal edema     MENTAL STATUS:  Alertness, attention span & concentration: normal.  Language: normal.  Orientation to self, place & time:  normal.  Fund of knowledge: normal.      SPEECH:  Clear and fluent.  Follows complex commands.    CRANIAL NERVES:  Cranial Nerves II-XII were examined.  II - Visual fields: normal.  III, IV, VI: PERRL, EOMI, No ptosis, No nystagmus.  V - Facial sensation: normal.  VII - Face symmetry & mobility: normal.  VIII - Hearing: normal.  IX, X - Palate: mobile & midline.  XI - Shoulder shrug: normal.  XII - Tongue protrusion: normal.    GROSS MOTOR:  Gait & station: normal.  Tone: normal.  Abnormal movements: none.  Finger-nose & Heel-knee-shin: normal.  Rapid alternating movements & drift: normal.  Romberg: absent    MUSCLE STRENGTH:     Fascics Atrophy RIGHT    LEFT Atrophy Fascics     5 Neck Ext. 5       5 Neck Flex 5       5 Deltoids 5       5 Sh.Ext.Rot. 5       5 Sh.Int.Rot. 5       5 Biceps 5       5 Triceps 5       5 Forearm.Pr. 5       5 Wrist Ext. 5       5 Wrist Flex 5       5 Finger Ext. 5       5 Finger Flex 5       5 FPL 5       5 Inteross. 5                     "     4- Iliopsoas 4-       5 Hip Abduct 5       5 Hip Adduct 5       5 Quads 5       5 Hams 5       5 Dorsiflex 5       5 Plantar Flex 5       5 Ankle Jose 5       5 Ankle Invert 5       5 Toe Ext. 5       5 Toe Flex 5                         REFLEXES:    RIGHT Reflex   LEFT   1+ Biceps 1+   1+ Brachiorad. 1+   1+ Triceps 1+        1+ Patellar 1+   1+ Ankle 1+        Down PLANTAR Down     SENSORY:  Light touch: Normal throughout.  Sharp touch: Gradient to knees  Vibration:  6 secionds at the right toe and 10 seconds at base of left MTP (great toe)   Temperature: Gradient to mid leg on the right and knee on the left   Joint Position: Intact to high amplitude change in the right and to ankle movements on the left but absent to toe amplitude change on the left     Tremor: Action tremor in hands, high frequency low amplitude tremor, worse on the left than right                Diagnostic Data Reviewed:     5/16/2013:  TSH- 0.910    4/15/2013:  b12- 609  hBa1C- 6.1    12/1/2010:  Vitamin D: 38    CT head 12/18/2010:    IMPRESSION:   1.  No acute intracranial process is identified.  2.  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  3.  Suggestive of meningiomas along the frontal lobes with a large one on   the right.      Assessment and Plan:    Yun Silva is a 80 y.o. female with pmh of DVT, PE, CHF, bladder incontinence, meningioma ( monitored with yearly MRIs), OA presents to the Neurology clinic for evaluation and management of neuropathy.     On examination she has a gradient to pinprick, vibration and temperature across her lower extremities. Additionally she has a low frequency high amplitude essential tremor.     Risk factors for neuropathy include pre-diabetes    Assessment:  1. Idiopathic progressive polyneuropathy  2. Essential tremor  3. Meningioma     Plan:    - EMG/ NCS to evaluate neuropathy   -Start Alpha-Lipoeid acid 600mg daily  - Increase Gabapentin 900 mg TID    -Genetic testing for hATTR genetic testing   - Blood work today for neuropathy  - Weighted spoons for tremor   - Would like to review previous MRI brain and monitor accordingly     During next visit, we will further evaluate her memory loss symptoms with MOCA  Testing. Additionally would check Vitamin D level.   Other medication options- Duloxetine / Venlafaxine      The patient will return to clinic in 6 months.    Important to note, also  has a past medical history of Arthritis, Basal cell carcinoma, CHF (congestive heart failure), COPD (chronic obstructive pulmonary disease), DVT (deep venous thrombosis), Hypertension, Meningioma, Peripheral neuropathy, Pulmonary emboli, and Sleep apnea.    Time Spent:  60 minutes spent face-to-face, >50% spent advising about: counseling and/or coordination of care    This note was created with voice recognition software.  Grammatical, syntax and spelling errors may be inevitable.    Problem List Items Addressed This Visit        Oncology    Meningioma      Other Visit Diagnoses     Idiopathic progressive polyneuropathy    -  Primary    Encounter for screening for human immunodeficiency virus (HIV)         Essential tremor

## 2020-01-29 NOTE — TELEPHONE ENCOUNTER
----- Message from Leticia Francisco MD sent at 1/29/2020  3:36 PM CST -----  Could you call this patient and let her know we need to look at her MRI brain if she can drop it off whenever she comes to the hospital and I can get is scanned.

## 2020-01-30 ENCOUNTER — TELEPHONE (OUTPATIENT)
Dept: NEUROLOGY | Facility: CLINIC | Age: 80
End: 2020-01-30

## 2020-01-30 LAB
ALBUMIN SERPL ELPH-MCNC: 4.03 G/DL (ref 3.35–5.55)
ALPHA1 GLOB SERPL ELPH-MCNC: 0.26 G/DL (ref 0.17–0.41)
ALPHA2 GLOB SERPL ELPH-MCNC: 0.81 G/DL (ref 0.43–0.99)
B-GLOBULIN SERPL ELPH-MCNC: 0.81 G/DL (ref 0.5–1.1)
GAMMA GLOB SERPL ELPH-MCNC: 0.69 G/DL (ref 0.67–1.58)
INTERPRETATION SERPL IFE-IMP: NORMAL
PATHOLOGIST INTERPRETATION IFE: NORMAL
PATHOLOGIST INTERPRETATION SPE: NORMAL
PROT SERPL-MCNC: 6.6 G/DL (ref 6–8.4)
RPR SER QL: NORMAL

## 2020-01-30 NOTE — TELEPHONE ENCOUNTER
----- Message from Anaya Salguero sent at 1/30/2020 12:43 PM CST -----  Contact: Self      Name of Who is Calling: VALENCIA DE [[id number]]      What is the request in detail: Pt was calling for Rivka to speak about the dropping off of blood collection kit.Please contact to further discuss and advise.        Can the clinic reply by [facility]: N      What Number to Call Back if not in [facility]: [phone]

## 2020-01-31 LAB
ANTI-SSA ANTIBODY: 0.06 RATIO (ref 0–0.99)
ANTI-SSA INTERPRETATION: NEGATIVE
B BURGDOR AB SER IA-ACNC: 0.06 INDEX VALUE

## 2020-02-01 LAB
ANA SER QL IF: NORMAL
VIT B1 BLD-MCNC: 100 UG/L (ref 38–122)

## 2020-02-07 DIAGNOSIS — R76.8 ELEVATED RHEUMATOID FACTOR: Primary | ICD-10-CM

## 2020-02-07 NOTE — TELEPHONE ENCOUNTER
Spoke with patient about neuropathy labs. Updated her.  Referral to Rheumatology , denies joint stiffness.

## 2020-02-21 ENCOUNTER — OFFICE VISIT (OUTPATIENT)
Dept: RHEUMATOLOGY | Facility: CLINIC | Age: 80
End: 2020-02-21
Payer: MEDICARE

## 2020-02-21 ENCOUNTER — LAB VISIT (OUTPATIENT)
Dept: LAB | Facility: HOSPITAL | Age: 80
End: 2020-02-21
Attending: INTERNAL MEDICINE
Payer: MEDICARE

## 2020-02-21 VITALS
WEIGHT: 224.88 LBS | SYSTOLIC BLOOD PRESSURE: 165 MMHG | DIASTOLIC BLOOD PRESSURE: 75 MMHG | HEART RATE: 90 BPM | BODY MASS INDEX: 39.84 KG/M2 | TEMPERATURE: 98 F | HEIGHT: 63 IN

## 2020-02-21 DIAGNOSIS — G60.9 IDIOPATHIC PERIPHERAL NEUROPATHY: Primary | ICD-10-CM

## 2020-02-21 DIAGNOSIS — R76.8 ELEVATED RHEUMATOID FACTOR: ICD-10-CM

## 2020-02-21 DIAGNOSIS — M15.9 PRIMARY OSTEOARTHRITIS INVOLVING MULTIPLE JOINTS: ICD-10-CM

## 2020-02-21 DIAGNOSIS — M35.00 SICCA SYNDROME, UNSPECIFIED: ICD-10-CM

## 2020-02-21 LAB
CARDIOLIPIN IGG SER IA-ACNC: <9.4 GPL (ref 0–14.99)
CARDIOLIPIN IGM SER IA-ACNC: <9.4 MPL (ref 0–12.49)
CCP AB SER IA-ACNC: <0.5 U/ML
CRP SERPL-MCNC: 11.9 MG/L (ref 0–8.2)

## 2020-02-21 PROCEDURE — 3077F PR MOST RECENT SYSTOLIC BLOOD PRESSURE >= 140 MM HG: ICD-10-PCS | Mod: CPTII,S$GLB,, | Performed by: INTERNAL MEDICINE

## 2020-02-21 PROCEDURE — 86147 CARDIOLIPIN ANTIBODY EA IG: CPT | Mod: 59

## 2020-02-21 PROCEDURE — 99205 PR OFFICE/OUTPT VISIT, NEW, LEVL V, 60-74 MIN: ICD-10-PCS | Mod: S$GLB,,, | Performed by: INTERNAL MEDICINE

## 2020-02-21 PROCEDURE — 99999 PR PBB SHADOW E&M-EST. PATIENT-LVL III: CPT | Mod: PBBFAC,,, | Performed by: INTERNAL MEDICINE

## 2020-02-21 PROCEDURE — 1159F PR MEDICATION LIST DOCUMENTED IN MEDICAL RECORD: ICD-10-PCS | Mod: S$GLB,,, | Performed by: INTERNAL MEDICINE

## 2020-02-21 PROCEDURE — 1101F PT FALLS ASSESS-DOCD LE1/YR: CPT | Mod: CPTII,S$GLB,, | Performed by: INTERNAL MEDICINE

## 2020-02-21 PROCEDURE — 99999 PR PBB SHADOW E&M-EST. PATIENT-LVL III: ICD-10-PCS | Mod: PBBFAC,,, | Performed by: INTERNAL MEDICINE

## 2020-02-21 PROCEDURE — 86140 C-REACTIVE PROTEIN: CPT

## 2020-02-21 PROCEDURE — 86200 CCP ANTIBODY: CPT

## 2020-02-21 PROCEDURE — 3078F PR MOST RECENT DIASTOLIC BLOOD PRESSURE < 80 MM HG: ICD-10-PCS | Mod: CPTII,S$GLB,, | Performed by: INTERNAL MEDICINE

## 2020-02-21 PROCEDURE — 86235 NUCLEAR ANTIGEN ANTIBODY: CPT

## 2020-02-21 PROCEDURE — 1126F AMNT PAIN NOTED NONE PRSNT: CPT | Mod: S$GLB,,, | Performed by: INTERNAL MEDICINE

## 2020-02-21 PROCEDURE — 1101F PR PT FALLS ASSESS DOC 0-1 FALLS W/OUT INJ PAST YR: ICD-10-PCS | Mod: CPTII,S$GLB,, | Performed by: INTERNAL MEDICINE

## 2020-02-21 PROCEDURE — 86146 BETA-2 GLYCOPROTEIN ANTIBODY: CPT

## 2020-02-21 PROCEDURE — 1126F PR PAIN SEVERITY QUANTIFIED, NO PAIN PRESENT: ICD-10-PCS | Mod: S$GLB,,, | Performed by: INTERNAL MEDICINE

## 2020-02-21 PROCEDURE — 1159F MED LIST DOCD IN RCRD: CPT | Mod: S$GLB,,, | Performed by: INTERNAL MEDICINE

## 2020-02-21 PROCEDURE — 3077F SYST BP >= 140 MM HG: CPT | Mod: CPTII,S$GLB,, | Performed by: INTERNAL MEDICINE

## 2020-02-21 PROCEDURE — 99205 OFFICE O/P NEW HI 60 MIN: CPT | Mod: S$GLB,,, | Performed by: INTERNAL MEDICINE

## 2020-02-21 PROCEDURE — 3078F DIAST BP <80 MM HG: CPT | Mod: CPTII,S$GLB,, | Performed by: INTERNAL MEDICINE

## 2020-02-21 PROCEDURE — 86235 NUCLEAR ANTIGEN ANTIBODY: CPT | Mod: 59

## 2020-02-21 ASSESSMENT — ROUTINE ASSESSMENT OF PATIENT INDEX DATA (RAPID3)
TOTAL RAPID3 SCORE: 5.78
PATIENT GLOBAL ASSESSMENT SCORE: 7
AM STIFFNESS SCORE: 1, YES
MDHAQ FUNCTION SCORE: 1
FATIGUE SCORE: 5
PAIN SCORE: 7
PSYCHOLOGICAL DISTRESS SCORE: 2.2

## 2020-02-21 NOTE — LETTER
February 23, 2020      Leticia Francisco MD  [address]  [address]  [facility] [address]           [facility] - Rheumatology  [address]  [place] [address]  Phone: [phone]  Fax: [phone]          Patient: Yun Silva   MR Number: [id number]   YOB: 1940   Date of Visit: 2/21/2020       Dear Dr. Leticia Francisco:    Thank you for referring Yun Silva to me for evaluation. Attached you will find relevant portions of my assessment and plan of care.    If you have questions, please do not hesitate to call me. I look forward to following Yun Silva along with you.    Sincerely,    Tavon Lopez MD    Enclosure  CC:  No Recipients    If you would like to receive this communication electronically, please contact [email or web address] or [phone] to request more information on [employer] Link access.    For providers and/or their staff who would like to refer a patient to [facility], please contact us through our one-stop-shop provider referral line, [facility], at [phone].    If you feel you have received this communication in error or would no longer like to receive these types of communications, please e-mail [email or web address]

## 2020-02-22 LAB
B2 GLYCOPROT1 IGA SER QL: <9 SAU
B2 GLYCOPROT1 IGG SER QL: <9 SGU
B2 GLYCOPROT1 IGM SER QL: <9 SMU

## 2020-02-24 LAB
ANTI-SSA ANTIBODY: 0.05 RATIO (ref 0–0.99)
ANTI-SSA INTERPRETATION: NEGATIVE
ANTI-SSB ANTIBODY: 0.09 RATIO (ref 0–0.99)
ANTI-SSB INTERPRETATION: NEGATIVE

## 2020-03-05 ENCOUNTER — PROCEDURE VISIT (OUTPATIENT)
Dept: NEUROLOGY | Facility: CLINIC | Age: 80
End: 2020-03-05
Payer: MEDICARE

## 2020-03-05 DIAGNOSIS — G60.3 IDIOPATHIC PROGRESSIVE POLYNEUROPATHY: ICD-10-CM

## 2020-03-05 PROCEDURE — 95886 MUSC TEST DONE W/N TEST COMP: CPT | Mod: S$GLB,,, | Performed by: PSYCHIATRY & NEUROLOGY

## 2020-03-05 PROCEDURE — 95913 NRV CNDJ TEST 13/> STUDIES: CPT | Mod: S$GLB,,, | Performed by: PSYCHIATRY & NEUROLOGY

## 2020-03-05 PROCEDURE — 95913 PR NERVE CONDUCTION STUDY; 13 OR MORE STUDIES: ICD-10-PCS | Mod: S$GLB,,, | Performed by: PSYCHIATRY & NEUROLOGY

## 2020-03-05 PROCEDURE — 95886 PR EMG COMPLETE, W/ NERVE CONDUCTION STUDIES, 5+ MUSCLES: ICD-10-PCS | Mod: S$GLB,,, | Performed by: PSYCHIATRY & NEUROLOGY

## 2020-03-10 NOTE — PROCEDURES
Procedures:    EMG/ NCS studies performed on 3/5/2020.  Report scanned in to patient chart.          Leticia Francisco MD  Medicine-Neurology, Clinical Neurophysiology

## 2020-03-26 RX ORDER — GABAPENTIN 600 MG/1
800 TABLET ORAL 3 TIMES DAILY
Qty: 90 TABLET | Refills: 11 | Status: SHIPPED | OUTPATIENT
Start: 2020-03-26 | End: 2020-03-30 | Stop reason: SDUPTHER

## 2020-03-26 RX ORDER — GABAPENTIN 800 MG/1
800 TABLET ORAL 3 TIMES DAILY
Qty: 270 TABLET | Refills: 3 | Status: CANCELLED | OUTPATIENT
Start: 2020-03-26

## 2020-03-26 NOTE — TELEPHONE ENCOUNTER
----- Message from Palak Knight sent at 3/26/2020  2:04 PM CDT -----  Contact: Enrique (Son)  Please refill the medication(s) listed below :    Medication # 1 :gabapentin (NEURONTIN) 800 MG tablet      Please call the patient when the prescription is sent to pharmacy :[phone]    Can the clinic reply in [facility] :No    Preferred Pharmacy : [facility] - [place], [place] - [address]

## 2020-03-30 NOTE — TELEPHONE ENCOUNTER
----- Message from Mary Alice Wells, Patient Care Assistant sent at 3/30/2020 10:24 AM CDT -----  Contact: Son  Can the clinic reply in [facility]: No    Please refill the medication(s) listed below. The patient can be reached at this phone number once it is called into the pharmacy.[phone]  Medication #1gabapentin (NEURONTIN) 600 MG tablet      Medication #2    Preferred Pharmacy:   [facility] - [facility] [address]  Requesting medication be sent to [facility] stating it was sent to wrong pharmacy.

## 2020-03-31 RX ORDER — GABAPENTIN 600 MG/1
800 TABLET ORAL 3 TIMES DAILY
Qty: 90 TABLET | Refills: 11 | Status: SHIPPED | OUTPATIENT
Start: 2020-03-31 | End: 2020-04-02 | Stop reason: SDUPTHER

## 2020-04-02 ENCOUNTER — TELEPHONE (OUTPATIENT)
Dept: NEUROLOGY | Facility: CLINIC | Age: 80
End: 2020-04-02

## 2020-04-02 RX ORDER — GABAPENTIN 600 MG/1
900 TABLET ORAL 3 TIMES DAILY
Qty: 90 TABLET | Refills: 11 | Status: SHIPPED | OUTPATIENT
Start: 2020-04-02 | End: 2021-01-06

## 2020-04-02 NOTE — TELEPHONE ENCOUNTER
----- Message from Sofia Yi sent at 4/2/2020  9:56 AM CDT -----  Contact: Pt son Eric FUNG  Name of Who is Calling: Pt son Eric FUNG    What is the request in detail: states the wrong dosage for the patient Rx ( gabapentin (NEURONTIN) 600 MG tablet and the wrong pharmacy it was sent too. Pt son states it should be 900mg and taken 3 times a day with 90 day and should be sent to [facility].Please contact to further discuss and advise      Can the clinic reply by [facility]: no    What Number to Call Back if not in [facility]: [phone]

## 2020-04-02 NOTE — TELEPHONE ENCOUNTER
----- Message from Josephine Rivera sent at 4/2/2020  3:45 PM CDT -----  Contact: Eric Silva (Son)  Name of Who is Calling: Eric Silva (Son)      What is the request in detail: Would like to speak to staff in regards to wanting to cancel the prescription for the gabapentin (NEURONTIN) 600 MG tablet. Please advise.       Can the clinic reply by [facility]: No      What Number to Call Back if not in [facility]: [phone]

## 2020-04-03 ENCOUNTER — PATIENT MESSAGE (OUTPATIENT)
Dept: NEUROLOGY | Facility: CLINIC | Age: 80
End: 2020-04-03

## 2020-09-14 ENCOUNTER — LAB VISIT (OUTPATIENT)
Dept: INTERNAL MEDICINE | Facility: CLINIC | Age: 80
End: 2020-09-14
Payer: MEDICARE

## 2020-09-14 DIAGNOSIS — Z20.828 EXPOSURE TO SARS-ASSOCIATED CORONAVIRUS: ICD-10-CM

## 2020-09-14 LAB — SARS-COV-2 RNA RESP QL NAA+PROBE: NOT DETECTED

## 2020-09-14 PROCEDURE — U0003 INFECTIOUS AGENT DETECTION BY NUCLEIC ACID (DNA OR RNA); SEVERE ACUTE RESPIRATORY SYNDROME CORONAVIRUS 2 (SARS-COV-2) (CORONAVIRUS DISEASE [COVID-19]), AMPLIFIED PROBE TECHNIQUE, MAKING USE OF HIGH THROUGHPUT TECHNOLOGIES AS DESCRIBED BY CMS-2020-01-R: HCPCS

## 2020-09-16 ENCOUNTER — HOSPITAL ENCOUNTER (OUTPATIENT)
Dept: RADIOLOGY | Facility: OTHER | Age: 80
Discharge: HOME | End: 2020-09-16
Attending: INTERNAL MEDICINE
Payer: MEDICARE

## 2020-09-16 DIAGNOSIS — S09.90XA TRAUMATIC INJURY OF HEAD, INITIAL ENCOUNTER: ICD-10-CM

## 2020-09-16 DIAGNOSIS — R50.9 FEVER, UNSPECIFIED FEVER CAUSE: ICD-10-CM

## 2020-09-16 PROCEDURE — 70450 CT HEAD/BRAIN W/O DYE: CPT | Mod: TC

## 2020-09-16 PROCEDURE — 71046 X-RAY EXAM CHEST 2 VIEWS: CPT | Mod: 26,,, | Performed by: RADIOLOGY

## 2020-09-16 PROCEDURE — 71046 X-RAY EXAM CHEST 2 VIEWS: CPT | Mod: TC,FY

## 2020-09-16 PROCEDURE — 70450 CT HEAD/BRAIN W/O DYE: CPT | Mod: 26,,, | Performed by: RADIOLOGY

## 2020-09-16 PROCEDURE — 70450 CT HEAD WITHOUT CONTRAST: ICD-10-PCS | Mod: 26,,, | Performed by: RADIOLOGY

## 2020-09-16 PROCEDURE — 71046 XR CHEST PA AND LATERAL: ICD-10-PCS | Mod: 26,,, | Performed by: RADIOLOGY

## 2021-04-05 ENCOUNTER — HOSPITAL ENCOUNTER (OUTPATIENT)
Dept: RADIOLOGY | Facility: OTHER | Age: 81
Discharge: HOME | End: 2021-04-05
Attending: INTERNAL MEDICINE
Payer: MEDICARE

## 2021-04-05 DIAGNOSIS — T14.90XA TRAUMA: ICD-10-CM

## 2021-04-05 PROCEDURE — 73130 X-RAY EXAM OF HAND: CPT | Mod: 26,LT,, | Performed by: RADIOLOGY

## 2021-04-05 PROCEDURE — 73130 XR HAND COMPLETE 3 VIEW LEFT: ICD-10-PCS | Mod: 26,LT,, | Performed by: RADIOLOGY

## 2021-04-05 PROCEDURE — 73130 X-RAY EXAM OF HAND: CPT | Mod: TC,FY,LT

## 2021-04-05 PROCEDURE — 71110 X-RAY EXAM RIBS BIL 3 VIEWS: CPT | Mod: 26,,, | Performed by: RADIOLOGY

## 2021-04-05 PROCEDURE — 71110 X-RAY EXAM RIBS BIL 3 VIEWS: CPT | Mod: TC,FY

## 2021-04-05 PROCEDURE — 71110 XR RIBS 3 VIEWS BILATERAL: ICD-10-PCS | Mod: 26,,, | Performed by: RADIOLOGY

## 2021-11-11 ENCOUNTER — TELEPHONE (OUTPATIENT)
Dept: OBSTETRICS AND GYNECOLOGY | Facility: CLINIC | Age: 81
End: 2021-11-11
Payer: MEDICARE

## 2021-11-11 ENCOUNTER — TELEPHONE (OUTPATIENT)
Dept: UROLOGY | Facility: CLINIC | Age: 81
End: 2021-11-11
Payer: MEDICARE

## 2021-11-12 ENCOUNTER — TELEPHONE (OUTPATIENT)
Dept: UROLOGY | Facility: CLINIC | Age: 81
End: 2021-11-12
Payer: MEDICARE

## 2021-11-15 ENCOUNTER — TELEPHONE (OUTPATIENT)
Dept: UROLOGY | Facility: CLINIC | Age: 81
End: 2021-11-15
Payer: MEDICARE

## 2021-11-17 ENCOUNTER — OFFICE VISIT (OUTPATIENT)
Dept: UROGYNECOLOGY | Facility: CLINIC | Age: 81
End: 2021-11-17
Payer: MEDICARE

## 2021-11-17 ENCOUNTER — TELEPHONE (OUTPATIENT)
Dept: UROGYNECOLOGY | Facility: CLINIC | Age: 81
End: 2021-11-17
Payer: MEDICARE

## 2021-11-17 ENCOUNTER — TELEPHONE (OUTPATIENT)
Dept: UROLOGY | Facility: CLINIC | Age: 81
End: 2021-11-17
Payer: MEDICARE

## 2021-11-17 VITALS
WEIGHT: 216.06 LBS | SYSTOLIC BLOOD PRESSURE: 149 MMHG | DIASTOLIC BLOOD PRESSURE: 65 MMHG | HEIGHT: 63 IN | BODY MASS INDEX: 38.28 KG/M2

## 2021-11-17 DIAGNOSIS — N39.46 URINARY INCONTINENCE, MIXED: Primary | ICD-10-CM

## 2021-11-17 DIAGNOSIS — N95.2 VAGINAL ATROPHY: ICD-10-CM

## 2021-11-17 DIAGNOSIS — K59.09 CHRONIC CONSTIPATION: ICD-10-CM

## 2021-11-17 PROCEDURE — 99999 PR PBB SHADOW E&M-EST. PATIENT-LVL III: CPT | Mod: PBBFAC,,, | Performed by: OBSTETRICS & GYNECOLOGY

## 2021-11-17 PROCEDURE — 87086 URINE CULTURE/COLONY COUNT: CPT | Performed by: OBSTETRICS & GYNECOLOGY

## 2021-11-17 PROCEDURE — 1101F PT FALLS ASSESS-DOCD LE1/YR: CPT | Mod: CPTII,S$GLB,, | Performed by: OBSTETRICS & GYNECOLOGY

## 2021-11-17 PROCEDURE — 99999 PR PBB SHADOW E&M-EST. PATIENT-LVL III: ICD-10-PCS | Mod: PBBFAC,,, | Performed by: OBSTETRICS & GYNECOLOGY

## 2021-11-17 PROCEDURE — 1159F PR MEDICATION LIST DOCUMENTED IN MEDICAL RECORD: ICD-10-PCS | Mod: CPTII,S$GLB,, | Performed by: OBSTETRICS & GYNECOLOGY

## 2021-11-17 PROCEDURE — 1159F MED LIST DOCD IN RCRD: CPT | Mod: CPTII,S$GLB,, | Performed by: OBSTETRICS & GYNECOLOGY

## 2021-11-17 PROCEDURE — 99204 OFFICE O/P NEW MOD 45 MIN: CPT | Mod: 25,S$GLB,, | Performed by: OBSTETRICS & GYNECOLOGY

## 2021-11-17 PROCEDURE — 51701 PR INSERTION OF NON-INDWELLING BLADDER CATHETERIZATION FOR RESIDUAL UR: ICD-10-PCS | Mod: S$GLB,,, | Performed by: OBSTETRICS & GYNECOLOGY

## 2021-11-17 PROCEDURE — 99204 PR OFFICE/OUTPT VISIT, NEW, LEVL IV, 45-59 MIN: ICD-10-PCS | Mod: 25,S$GLB,, | Performed by: OBSTETRICS & GYNECOLOGY

## 2021-11-17 PROCEDURE — 3078F PR MOST RECENT DIASTOLIC BLOOD PRESSURE < 80 MM HG: ICD-10-PCS | Mod: CPTII,S$GLB,, | Performed by: OBSTETRICS & GYNECOLOGY

## 2021-11-17 PROCEDURE — 1101F PR PT FALLS ASSESS DOC 0-1 FALLS W/OUT INJ PAST YR: ICD-10-PCS | Mod: CPTII,S$GLB,, | Performed by: OBSTETRICS & GYNECOLOGY

## 2021-11-17 PROCEDURE — 3078F DIAST BP <80 MM HG: CPT | Mod: CPTII,S$GLB,, | Performed by: OBSTETRICS & GYNECOLOGY

## 2021-11-17 PROCEDURE — 1160F PR REVIEW ALL MEDS BY PRESCRIBER/CLIN PHARMACIST DOCUMENTED: ICD-10-PCS | Mod: CPTII,S$GLB,, | Performed by: OBSTETRICS & GYNECOLOGY

## 2021-11-17 PROCEDURE — 1160F RVW MEDS BY RX/DR IN RCRD: CPT | Mod: CPTII,S$GLB,, | Performed by: OBSTETRICS & GYNECOLOGY

## 2021-11-17 PROCEDURE — 51701 INSERT BLADDER CATHETER: CPT | Mod: S$GLB,,, | Performed by: OBSTETRICS & GYNECOLOGY

## 2021-11-17 PROCEDURE — 3077F PR MOST RECENT SYSTOLIC BLOOD PRESSURE >= 140 MM HG: ICD-10-PCS | Mod: CPTII,S$GLB,, | Performed by: OBSTETRICS & GYNECOLOGY

## 2021-11-17 PROCEDURE — 3077F SYST BP >= 140 MM HG: CPT | Mod: CPTII,S$GLB,, | Performed by: OBSTETRICS & GYNECOLOGY

## 2021-11-17 PROCEDURE — 3288F FALL RISK ASSESSMENT DOCD: CPT | Mod: CPTII,S$GLB,, | Performed by: OBSTETRICS & GYNECOLOGY

## 2021-11-17 PROCEDURE — 1126F AMNT PAIN NOTED NONE PRSNT: CPT | Mod: CPTII,S$GLB,, | Performed by: OBSTETRICS & GYNECOLOGY

## 2021-11-17 PROCEDURE — 3288F PR FALLS RISK ASSESSMENT DOCUMENTED: ICD-10-PCS | Mod: CPTII,S$GLB,, | Performed by: OBSTETRICS & GYNECOLOGY

## 2021-11-17 PROCEDURE — 1126F PR PAIN SEVERITY QUANTIFIED, NO PAIN PRESENT: ICD-10-PCS | Mod: CPTII,S$GLB,, | Performed by: OBSTETRICS & GYNECOLOGY

## 2021-11-17 RX ORDER — ROSUVASTATIN CALCIUM 5 MG/1
5 TABLET, COATED ORAL DAILY
COMMUNITY

## 2021-11-18 ENCOUNTER — TELEPHONE (OUTPATIENT)
Dept: UROLOGY | Facility: CLINIC | Age: 81
End: 2021-11-18
Payer: MEDICARE

## 2021-11-19 LAB — BACTERIA UR CULT: NO GROWTH

## 2021-11-22 ENCOUNTER — TELEPHONE (OUTPATIENT)
Dept: UROGYNECOLOGY | Facility: CLINIC | Age: 81
End: 2021-11-22
Payer: MEDICARE

## 2021-12-16 ENCOUNTER — OFFICE VISIT (OUTPATIENT)
Dept: UROGYNECOLOGY | Facility: CLINIC | Age: 81
End: 2021-12-16
Payer: MEDICARE

## 2021-12-16 ENCOUNTER — PROCEDURE VISIT (OUTPATIENT)
Dept: UROGYNECOLOGY | Facility: CLINIC | Age: 81
End: 2021-12-16
Payer: MEDICARE

## 2021-12-16 VITALS
SYSTOLIC BLOOD PRESSURE: 136 MMHG | WEIGHT: 214.31 LBS | BODY MASS INDEX: 37.97 KG/M2 | HEIGHT: 63 IN | DIASTOLIC BLOOD PRESSURE: 60 MMHG

## 2021-12-16 VITALS
SYSTOLIC BLOOD PRESSURE: 136 MMHG | HEIGHT: 63 IN | BODY MASS INDEX: 37.97 KG/M2 | WEIGHT: 214.31 LBS | DIASTOLIC BLOOD PRESSURE: 60 MMHG

## 2021-12-16 DIAGNOSIS — N39.46 URINARY INCONTINENCE, MIXED: ICD-10-CM

## 2021-12-16 DIAGNOSIS — N39.46 MIXED STRESS AND URGE URINARY INCONTINENCE: Primary | ICD-10-CM

## 2021-12-16 LAB
BILIRUB SERPL-MCNC: NORMAL MG/DL
BLOOD URINE, POC: NORMAL
CLARITY, POC UA: CLEAR
COLOR, POC UA: YELLOW
GLUCOSE UR QL STRIP: NORMAL
KETONES UR QL STRIP: NORMAL
LEUKOCYTE ESTERASE URINE, POC: NORMAL
NITRITE, POC UA: NORMAL
PH, POC UA: 7
PROTEIN, POC: NORMAL
SPECIFIC GRAVITY, POC UA: 1
UROBILINOGEN, POC UA: NORMAL

## 2021-12-16 PROCEDURE — 3288F FALL RISK ASSESSMENT DOCD: CPT | Mod: CPTII,S$GLB,, | Performed by: NURSE PRACTITIONER

## 2021-12-16 PROCEDURE — 3078F DIAST BP <80 MM HG: CPT | Mod: CPTII,S$GLB,, | Performed by: NURSE PRACTITIONER

## 2021-12-16 PROCEDURE — 99499 UNLISTED E&M SERVICE: CPT | Mod: S$GLB,,, | Performed by: NURSE PRACTITIONER

## 2021-12-16 PROCEDURE — 51797 INTRAABDOMINAL PRESSURE TEST: CPT | Mod: S$GLB,,, | Performed by: OBSTETRICS & GYNECOLOGY

## 2021-12-16 PROCEDURE — 1159F PR MEDICATION LIST DOCUMENTED IN MEDICAL RECORD: ICD-10-PCS | Mod: CPTII,S$GLB,, | Performed by: NURSE PRACTITIONER

## 2021-12-16 PROCEDURE — 51784 PR ANAL/URINARY MUSCLE STUDY: ICD-10-PCS | Mod: 51,S$GLB,, | Performed by: OBSTETRICS & GYNECOLOGY

## 2021-12-16 PROCEDURE — 52000 CYSTOURETHROSCOPY: CPT | Mod: 59,S$GLB,, | Performed by: OBSTETRICS & GYNECOLOGY

## 2021-12-16 PROCEDURE — 1159F MED LIST DOCD IN RCRD: CPT | Mod: CPTII,S$GLB,, | Performed by: NURSE PRACTITIONER

## 2021-12-16 PROCEDURE — 1126F AMNT PAIN NOTED NONE PRSNT: CPT | Mod: CPTII,S$GLB,, | Performed by: NURSE PRACTITIONER

## 2021-12-16 PROCEDURE — 99499 NO LOS: ICD-10-PCS | Mod: S$GLB,,, | Performed by: NURSE PRACTITIONER

## 2021-12-16 PROCEDURE — 52000 PR CYSTOURETHROSCOPY: ICD-10-PCS | Mod: 59,S$GLB,, | Performed by: OBSTETRICS & GYNECOLOGY

## 2021-12-16 PROCEDURE — 99999 PR PBB SHADOW E&M-EST. PATIENT-LVL III: CPT | Mod: PBBFAC,,, | Performed by: NURSE PRACTITIONER

## 2021-12-16 PROCEDURE — 3075F SYST BP GE 130 - 139MM HG: CPT | Mod: CPTII,S$GLB,, | Performed by: NURSE PRACTITIONER

## 2021-12-16 PROCEDURE — 51784 ANAL/URINARY MUSCLE STUDY: CPT | Mod: 51,S$GLB,, | Performed by: OBSTETRICS & GYNECOLOGY

## 2021-12-16 PROCEDURE — 51728 PR COMPLEX CYSTOMETROGRAM VOIDING PRESSURE STUDIES: ICD-10-PCS | Mod: S$GLB,,, | Performed by: OBSTETRICS & GYNECOLOGY

## 2021-12-16 PROCEDURE — 99999 PR PBB SHADOW E&M-EST. PATIENT-LVL III: ICD-10-PCS | Mod: PBBFAC,,, | Performed by: NURSE PRACTITIONER

## 2021-12-16 PROCEDURE — 81002 URINALYSIS NONAUTO W/O SCOPE: CPT | Mod: S$GLB,,, | Performed by: OBSTETRICS & GYNECOLOGY

## 2021-12-16 PROCEDURE — 51797 PR VOIDING PRESS STUDY INTRA-ABDOMINAL VOID: ICD-10-PCS | Mod: S$GLB,,, | Performed by: OBSTETRICS & GYNECOLOGY

## 2021-12-16 PROCEDURE — 3288F PR FALLS RISK ASSESSMENT DOCUMENTED: ICD-10-PCS | Mod: CPTII,S$GLB,, | Performed by: NURSE PRACTITIONER

## 2021-12-16 PROCEDURE — 3075F PR MOST RECENT SYSTOLIC BLOOD PRESS GE 130-139MM HG: ICD-10-PCS | Mod: CPTII,S$GLB,, | Performed by: NURSE PRACTITIONER

## 2021-12-16 PROCEDURE — 51728 CYSTOMETROGRAM W/VP: CPT | Mod: S$GLB,,, | Performed by: OBSTETRICS & GYNECOLOGY

## 2021-12-16 PROCEDURE — 1101F PT FALLS ASSESS-DOCD LE1/YR: CPT | Mod: CPTII,S$GLB,, | Performed by: NURSE PRACTITIONER

## 2021-12-16 PROCEDURE — 3078F PR MOST RECENT DIASTOLIC BLOOD PRESSURE < 80 MM HG: ICD-10-PCS | Mod: CPTII,S$GLB,, | Performed by: NURSE PRACTITIONER

## 2021-12-16 PROCEDURE — 81002 POCT URINE DIPSTICK WITHOUT MICROSCOPE: ICD-10-PCS | Mod: S$GLB,,, | Performed by: OBSTETRICS & GYNECOLOGY

## 2021-12-16 PROCEDURE — 1160F RVW MEDS BY RX/DR IN RCRD: CPT | Mod: CPTII,S$GLB,, | Performed by: NURSE PRACTITIONER

## 2021-12-16 PROCEDURE — 1101F PR PT FALLS ASSESS DOC 0-1 FALLS W/OUT INJ PAST YR: ICD-10-PCS | Mod: CPTII,S$GLB,, | Performed by: NURSE PRACTITIONER

## 2021-12-16 PROCEDURE — 1126F PR PAIN SEVERITY QUANTIFIED, NO PAIN PRESENT: ICD-10-PCS | Mod: CPTII,S$GLB,, | Performed by: NURSE PRACTITIONER

## 2021-12-16 PROCEDURE — 1160F PR REVIEW ALL MEDS BY PRESCRIBER/CLIN PHARMACIST DOCUMENTED: ICD-10-PCS | Mod: CPTII,S$GLB,, | Performed by: NURSE PRACTITIONER

## 2021-12-16 RX ORDER — SULFAMETHOXAZOLE AND TRIMETHOPRIM 800; 160 MG/1; MG/1
1 TABLET ORAL
Status: COMPLETED | OUTPATIENT
Start: 2021-12-16 | End: 2021-12-16

## 2021-12-16 RX ORDER — LIDOCAINE HYDROCHLORIDE 20 MG/ML
JELLY TOPICAL ONCE
Status: COMPLETED | OUTPATIENT
Start: 2021-12-16 | End: 2021-12-16

## 2021-12-16 RX ADMIN — LIDOCAINE HYDROCHLORIDE 5 ML: 20 JELLY TOPICAL at 02:12

## 2021-12-16 RX ADMIN — SULFAMETHOXAZOLE AND TRIMETHOPRIM 1 TABLET: 800; 160 TABLET ORAL at 02:12

## 2021-12-20 ENCOUNTER — TELEPHONE (OUTPATIENT)
Dept: UROGYNECOLOGY | Facility: HOSPITAL | Age: 81
End: 2021-12-20
Payer: MEDICARE

## 2021-12-20 ENCOUNTER — PATIENT MESSAGE (OUTPATIENT)
Dept: UROGYNECOLOGY | Facility: CLINIC | Age: 81
End: 2021-12-20
Payer: MEDICARE

## 2021-12-20 DIAGNOSIS — R39.15 URINARY URGENCY: ICD-10-CM

## 2021-12-20 DIAGNOSIS — N39.41 URINARY INCONTINENCE, URGE: Primary | ICD-10-CM

## 2021-12-27 ENCOUNTER — TELEPHONE (OUTPATIENT)
Dept: UROGYNECOLOGY | Facility: CLINIC | Age: 81
End: 2021-12-27
Payer: MEDICARE

## 2021-12-31 ENCOUNTER — INFUSION (OUTPATIENT)
Dept: INFECTIOUS DISEASES | Facility: HOSPITAL | Age: 81
End: 2021-12-31
Attending: INTERNAL MEDICINE
Payer: MEDICARE

## 2021-12-31 VITALS
OXYGEN SATURATION: 94 % | WEIGHT: 210 LBS | HEIGHT: 63 IN | SYSTOLIC BLOOD PRESSURE: 139 MMHG | BODY MASS INDEX: 37.21 KG/M2 | HEART RATE: 76 BPM | RESPIRATION RATE: 18 BRPM | DIASTOLIC BLOOD PRESSURE: 63 MMHG | TEMPERATURE: 99 F

## 2021-12-31 DIAGNOSIS — U07.1 COVID: ICD-10-CM

## 2021-12-31 DIAGNOSIS — U07.1 COVID-19: Primary | ICD-10-CM

## 2021-12-31 PROCEDURE — 25000003 PHARM REV CODE 250: Performed by: INTERNAL MEDICINE

## 2021-12-31 PROCEDURE — M0243 CASIRIVI AND IMDEVI INFUSION: HCPCS | Performed by: INTERNAL MEDICINE

## 2021-12-31 PROCEDURE — 63600175 PHARM REV CODE 636 W HCPCS: Performed by: INTERNAL MEDICINE

## 2021-12-31 RX ORDER — SODIUM CHLORIDE 0.9 % (FLUSH) 0.9 %
10 SYRINGE (ML) INJECTION
Status: ACTIVE | OUTPATIENT
Start: 2021-12-31

## 2021-12-31 RX ORDER — ALBUTEROL SULFATE 90 UG/1
2 AEROSOL, METERED RESPIRATORY (INHALATION)
Status: ACTIVE | OUTPATIENT
Start: 2021-12-31

## 2021-12-31 RX ORDER — DIPHENHYDRAMINE HYDROCHLORIDE 50 MG/ML
25 INJECTION INTRAMUSCULAR; INTRAVENOUS ONCE AS NEEDED
Status: ACTIVE | OUTPATIENT
Start: 2021-12-31 | End: 2033-05-29

## 2021-12-31 RX ORDER — ACETAMINOPHEN 325 MG/1
650 TABLET ORAL ONCE AS NEEDED
Status: ACTIVE | OUTPATIENT
Start: 2021-12-31 | End: 2033-05-29

## 2021-12-31 RX ORDER — EPINEPHRINE 0.3 MG/.3ML
0.3 INJECTION SUBCUTANEOUS
Status: ACTIVE | OUTPATIENT
Start: 2021-12-31

## 2021-12-31 RX ORDER — ONDANSETRON 4 MG/1
4 TABLET, ORALLY DISINTEGRATING ORAL ONCE AS NEEDED
Status: ACTIVE | OUTPATIENT
Start: 2021-12-31 | End: 2033-05-29

## 2021-12-31 RX ADMIN — CASIRIVIMAB AND IMDEVIMAB 600 MG: 600; 600 INJECTION, SOLUTION, CONCENTRATE INTRAVENOUS at 01:12

## 2022-01-18 ENCOUNTER — TELEPHONE (OUTPATIENT)
Dept: UROLOGY | Facility: CLINIC | Age: 82
End: 2022-01-18
Payer: MEDICARE

## 2022-01-18 DIAGNOSIS — N95.2 VAGINAL ATROPHY: Primary | ICD-10-CM

## 2022-01-18 RX ORDER — ESTRADIOL 0.1 MG/G
CREAM VAGINAL
Qty: 42.5 G | Refills: 11 | Status: SHIPPED | OUTPATIENT
Start: 2022-01-18

## 2022-01-18 NOTE — LETTER
[facility]  [address] [address]  Phone: [phone]  Fax: [phone]   Patient: Yun Silva   MR Number: [id number]   YOB: 1940   Date of Visit: 2022   Myrbetriq Clearance      Date : 2022    RE:  Yun Silva.  : 1940     Dear Dr. Hernandez,     Yun Silva ( 40) is under your care for hypertension, chronic heart failure, and history of pacemaker implant. Dr. Doroteo Proctor would like to start Ms. Silva on Myrbetriq for overactive bladder. Given the potential for elevated blood pressure and her history of hypertension, we would like to obtain clearance before starting the medication.       Is Ms. Silva cleared to begin Myrbetriq?    Please fax this clearance to [phone]. Thank you for your help in this matter.    Sincerely,    Barbi Moreno  Pelvic Floor Nurse Navigator  Tel: [phone]  [email or web address]

## 2022-01-18 NOTE — TELEPHONE ENCOUNTER
Medication/behavioral modification check-in-    Patient reports forgetting to do PFE- advised patient to do every commercial break to remember. She is managing her constipation w/OTC meds. Declines PFPT.     Has not started OAB med- she would like to try Myrbetriq again. She is concerned about side effects she had when previously taking other OAB medications but cannot remember what the SE were. She would like to start estrogen cream as previously discussed.

## 2022-01-20 NOTE — TELEPHONE ENCOUNTER
LVM for Dr. Hernandez's office to obtain clearance for starting Myrbetriq per Dr. Proctor's instructions.

## 2022-01-25 ENCOUNTER — TELEPHONE (OUTPATIENT)
Dept: UROLOGY | Facility: CLINIC | Age: 82
End: 2022-01-25
Payer: MEDICARE

## 2022-01-26 ENCOUNTER — TELEPHONE (OUTPATIENT)
Dept: UROGYNECOLOGY | Facility: CLINIC | Age: 82
End: 2022-01-26
Payer: MEDICARE

## 2022-01-26 NOTE — TELEPHONE ENCOUNTER
Spoke to pt, she wanted to know if she can use the vaginal estrogen before or after intercourse. Pt was informed per Ashu that after intercourse she should clean the area and apply because she doesn't want her male partner to come in contact with the estrogen. Pt verbalized understanding

## 2022-01-26 NOTE — TELEPHONE ENCOUNTER
----- Message from Sergo Albarran sent at 1/26/2022  3:19 PM CST -----  Name of Who is Calling: VALENCIA DE [[id number]]           What is the request in detail: Patient is requesting a call back to discuss her medication.           Can the clinic reply by [facility]: NO           What Number to Call Back if not in [facility]: [phone]

## 2022-02-02 ENCOUNTER — TELEPHONE (OUTPATIENT)
Dept: UROLOGY | Facility: CLINIC | Age: 82
End: 2022-02-02
Payer: MEDICARE

## 2022-02-02 NOTE — TELEPHONE ENCOUNTER
Called Dr. Hernandez's office to get clearance to start Myrbetriq-  Left a message w/Cheri for his nurse (Dagmar).

## 2022-02-15 ENCOUNTER — TELEPHONE (OUTPATIENT)
Dept: UROLOGY | Facility: CLINIC | Age: 82
End: 2022-02-15
Payer: MEDICARE

## 2022-02-15 NOTE — TELEPHONE ENCOUNTER
Spoke to patient to update on Myrbetriq clearance- she has left message for clinic and I have faxed clearance/left message for Dr. Hernandez's nurse x3 (Dagmar/office- [phone] . She will call again. Fwd to Dr. Proctor for awareness.    Also discusses estradiol cream, behavior.

## 2022-03-04 ENCOUNTER — TELEPHONE (OUTPATIENT)
Dept: UROLOGY | Facility: CLINIC | Age: 82
End: 2022-03-04
Payer: MEDICARE

## 2022-03-04 NOTE — TELEPHONE ENCOUNTER
Called again for Myrbetriq clearance- LVM for nurse. Per Nelly, nurse returned call on 2/16 (no record of call)- Will refax clearance to [phone]

## 2022-03-14 ENCOUNTER — TELEPHONE (OUTPATIENT)
Dept: UROLOGY | Facility: CLINIC | Age: 82
End: 2022-03-14
Payer: MEDICARE

## 2022-03-14 NOTE — TELEPHONE ENCOUNTER
Called pharmacy to verify they still had rx. Notified patient to begin rx.  Reviewed estradiol instructions and Myrbetriq/possible SE. Scheduled PFPT- patient will call back w/schedule to r/s appt chinyere/Caitlin.

## 2022-03-18 ENCOUNTER — TELEPHONE (OUTPATIENT)
Dept: UROLOGY | Facility: CLINIC | Age: 82
End: 2022-03-18
Payer: MEDICARE

## 2022-03-18 NOTE — TELEPHONE ENCOUNTER
Instructed patient to continue estradiol using 2nd knuckle.    ----- Message from Caitlin Hurley NP sent at 3/17/2022  9:27 AM CDT -----  Generally I ask the to apply it with their finger if the applicator is hurting them.  I tell them to insert the cream to the second knuckle and rub around the inside of the vagina. Would you mind asking her to do that instead of just stopping?    Thanks,  Caitlin  ----- Message -----  From: Barbi Moreno RN  Sent: 3/17/2022   9:21 AM CDT  To: Caitlin Hurley NP    Patient reports difficulty w/estradiol application- reviewed instructions. She reports she feels like she scratched herself while attempting to use the applicator- instructed to d/c cream until I talked to you. Is is okay if she stops the estradiol until her appt on the 1st? I instructed her to bring in her cream to the appointment so she could review application.  Thanks!

## 2022-03-24 ENCOUNTER — TELEPHONE (OUTPATIENT)
Dept: UROLOGY | Facility: CLINIC | Age: 82
End: 2022-03-24
Payer: MEDICARE

## 2022-03-24 NOTE — TELEPHONE ENCOUNTER
Patient reports she tried to use her knuckle to apply estrogen cream but her arms are too short. She is now trying to use finger condoms to make sure she doesn't scratch herself.    Also reports Deepak from [facility] called this am to instruct patient to tell provider's office to apply for [employer] cost-support program or switch to Oxybutynin. Program is not available for Medicare recipients and there is no record of insurance company contacting office. Instructed patient to continue medication as prescribed.

## 2022-04-01 ENCOUNTER — OFFICE VISIT (OUTPATIENT)
Dept: UROGYNECOLOGY | Facility: CLINIC | Age: 82
End: 2022-04-01
Payer: MEDICARE

## 2022-04-01 VITALS
BODY MASS INDEX: 38.28 KG/M2 | WEIGHT: 216.06 LBS | SYSTOLIC BLOOD PRESSURE: 122 MMHG | HEIGHT: 63 IN | DIASTOLIC BLOOD PRESSURE: 62 MMHG

## 2022-04-01 DIAGNOSIS — K59.09 CHRONIC CONSTIPATION: ICD-10-CM

## 2022-04-01 DIAGNOSIS — N95.2 VAGINAL ATROPHY: ICD-10-CM

## 2022-04-01 DIAGNOSIS — N39.46 MIXED STRESS AND URGE URINARY INCONTINENCE: Primary | ICD-10-CM

## 2022-04-01 PROCEDURE — 1125F AMNT PAIN NOTED PAIN PRSNT: CPT | Mod: CPTII,S$GLB,, | Performed by: NURSE PRACTITIONER

## 2022-04-01 PROCEDURE — 3078F PR MOST RECENT DIASTOLIC BLOOD PRESSURE < 80 MM HG: ICD-10-PCS | Mod: CPTII,S$GLB,, | Performed by: NURSE PRACTITIONER

## 2022-04-01 PROCEDURE — 99213 OFFICE O/P EST LOW 20 MIN: CPT | Mod: S$GLB,,, | Performed by: NURSE PRACTITIONER

## 2022-04-01 PROCEDURE — 1101F PR PT FALLS ASSESS DOC 0-1 FALLS W/OUT INJ PAST YR: ICD-10-PCS | Mod: CPTII,S$GLB,, | Performed by: NURSE PRACTITIONER

## 2022-04-01 PROCEDURE — 99999 PR PBB SHADOW E&M-EST. PATIENT-LVL IV: ICD-10-PCS | Mod: PBBFAC,,, | Performed by: NURSE PRACTITIONER

## 2022-04-01 PROCEDURE — 99213 PR OFFICE/OUTPT VISIT, EST, LEVL III, 20-29 MIN: ICD-10-PCS | Mod: S$GLB,,, | Performed by: NURSE PRACTITIONER

## 2022-04-01 PROCEDURE — 99999 PR PBB SHADOW E&M-EST. PATIENT-LVL IV: CPT | Mod: PBBFAC,,, | Performed by: NURSE PRACTITIONER

## 2022-04-01 PROCEDURE — 3074F SYST BP LT 130 MM HG: CPT | Mod: CPTII,S$GLB,, | Performed by: NURSE PRACTITIONER

## 2022-04-01 PROCEDURE — 1159F PR MEDICATION LIST DOCUMENTED IN MEDICAL RECORD: ICD-10-PCS | Mod: CPTII,S$GLB,, | Performed by: NURSE PRACTITIONER

## 2022-04-01 PROCEDURE — 1101F PT FALLS ASSESS-DOCD LE1/YR: CPT | Mod: CPTII,S$GLB,, | Performed by: NURSE PRACTITIONER

## 2022-04-01 PROCEDURE — 3288F FALL RISK ASSESSMENT DOCD: CPT | Mod: CPTII,S$GLB,, | Performed by: NURSE PRACTITIONER

## 2022-04-01 PROCEDURE — 1160F PR REVIEW ALL MEDS BY PRESCRIBER/CLIN PHARMACIST DOCUMENTED: ICD-10-PCS | Mod: CPTII,S$GLB,, | Performed by: NURSE PRACTITIONER

## 2022-04-01 PROCEDURE — 1125F PR PAIN SEVERITY QUANTIFIED, PAIN PRESENT: ICD-10-PCS | Mod: CPTII,S$GLB,, | Performed by: NURSE PRACTITIONER

## 2022-04-01 PROCEDURE — 1160F RVW MEDS BY RX/DR IN RCRD: CPT | Mod: CPTII,S$GLB,, | Performed by: NURSE PRACTITIONER

## 2022-04-01 PROCEDURE — 1159F MED LIST DOCD IN RCRD: CPT | Mod: CPTII,S$GLB,, | Performed by: NURSE PRACTITIONER

## 2022-04-01 PROCEDURE — 3288F PR FALLS RISK ASSESSMENT DOCUMENTED: ICD-10-PCS | Mod: CPTII,S$GLB,, | Performed by: NURSE PRACTITIONER

## 2022-04-01 PROCEDURE — 3074F PR MOST RECENT SYSTOLIC BLOOD PRESSURE < 130 MM HG: ICD-10-PCS | Mod: CPTII,S$GLB,, | Performed by: NURSE PRACTITIONER

## 2022-04-01 PROCEDURE — 3078F DIAST BP <80 MM HG: CPT | Mod: CPTII,S$GLB,, | Performed by: NURSE PRACTITIONER

## 2022-04-01 NOTE — PROGRESS NOTES
Urogyn follow up  04/01/2022  .  [facility] - UROGYNECOLOGY  [address] [id number]    Yun Silva  [id number]  1940      Yun Silva is a 82 y.o. here for a urogyn follow up of mixed urinary incontinence.    Last HPI from 11/17/2021    1)  UI:  (--) AICHA.  (+) UUI  X years.  Increased urgency.   (+) pads:2+/day, usually variable wetness--wetter if waits too long and 1-2 pullups at night usually severe wetness.  Daytime frequency: Q 45 min- 1 hours. Sometimes goes longer stretches.  Takes diuretics in AM. Stopped coffee. Shaking in car makes worse. Nocturia: Yes: 1-2/night.   (--) dysuria,  (--) hematuria,  (--) frequent UTIs. +h/o UTI requiring hospitalization--no symptoms.   (+) complete bladder emptying. Pushes to help.  Feels like she struggles to get it out.   --has seen URO in past--tried several meds, none really helped; Botox was suggested but didn't want to do this  --uses walker s/p knee surgery and for balance; bathrooms are accessible; has beside commode--only uses periodically  --sounds like had UDS a few years ago     2)  POP:  Absent.(--) vaginal bleeding. (--) vaginal discharge. (+) sexually active.  (--) dyspareunia.  (--)  Vaginal dryness.  (--) vaginal estrogen use.      3)  BM:  (+) constipation/straining--has to work on constantly. Takes 3 fiber gummies BID--not helping as much as she'd like.   (--) chronic diarrhea. (--) hematochezia.  (--) fecal incontinence.  (--) fecal smearing/urgency.  (+) complete evacuation.       Changes from last visit:  1)  Mixed urinary incontinence, urge > stress:    --UUI improved with myrbetriq 50 mg daily  --voiding every 45 minutes - 1 hour.  --+ AICHA with standing  --using 1-3 pads with minimal to moderate wetness  --has not done pelvic floor PT     2)  Vaginal atrophy (dryness):   --not using vaginal estrogen cream     3)  Constipation:  --+constipation  --taking fiber gummies: 3 in AM, 3 in PM  --drinking approximately  32-48 ounces/ day  --not taking stool softener (ducosate sodium) 2x/day  --not taking magnesium oxide 400 mg daily              Past Medical History:   Diagnosis Date    Arthritis     Basal cell carcinoma     CHF (congestive heart failure)     COPD (chronic obstructive pulmonary disease)     DVT (deep venous thrombosis)     Hypertension     Meningioma     Peripheral neuropathy     Pulmonary emboli     Sleep apnea        Past Surgical History:   Procedure Laterality Date    A-V CARDIAC PACEMAKER INSERTION Left 10/2/2018    Procedure: INSERTION, CARDIAC PACEMAKER, DUAL CHAMBER;  Surgeon: Otoniel Hogan MD;  Location: [facility] CATH LAB;  Service: Cardiology;  Laterality: Left;    CARPAL TUNNEL RELEASE Bilateral     CHOLECYSTECTOMY      DILATION AND CURETTAGE OF UTERUS      EYE SURGERY      bilateral cataracts    MOH's surgery      TONSILLECTOMY      TRIGGER FINGER RELEASE         Family History   Problem Relation Age of Onset    Hypertension Mother     Heart disease Mother         arrythmias    Heart disease Father         MI    Heart disease Brother         MI at 64    Diabetes Neg Hx        Social History     Socioeconomic History    Marital status:    Tobacco Use    Smoking status: Never Smoker    Smokeless tobacco: Never Used   Substance and Sexual Activity    Alcohol use: No    Drug use: No       Current Outpatient Medications   Medication Sig Dispense Refill    acebutolol (SECTRAL) 200 MG capsule Take 1 capsule (200 mg total) by mouth 2 (two) times daily. 180 capsule 3    apixaban (ELIQUIS) 5 mg Tab Take 5 mg by mouth once daily.      aspirin (ECOTRIN) 81 MG EC tablet Take 162 mg by mouth.       cranberry fruit concentrate (AZO CRANBERRY ORAL) Take by mouth once daily.      estradioL (ESTRACE) 0.01 % (0.1 mg/gram) vaginal cream 0.5 grams with applicator or dime-sized amount with finger in vagina nightly x 2 weeks, then twice a week thereafter 42.5 g 11    ferrous sulfate  (FEOSOL) 325 mg (65 mg iron) Tab tablet Take 1 tablet (325 mg total) by mouth every other day. 45 tablet 3    gabapentin (NEURONTIN) 800 MG tablet TAKE 1 TABLET THREE TIMES DAILY 270 tablet 3    hydrALAZINE (APRESOLINE) 50 MG tablet Take 50 mg by mouth 4 (four) times daily.      hydroCHLOROthiazide (HYDRODIURIL) 25 MG tablet Take 1 tablet (25 mg total) by mouth every Mon, Wed, Fri. 90 tablet 3    MULTIVITAMIN W-MINERALS/LUTEIN (CENTRUM SILVER ORAL) Take by mouth.      NIFEdipine (PROCARDIA-XL) 60 MG (OSM) 24 hr tablet Take 60 mg by mouth once daily.      pantoprazole (PROTONIX) 40 MG tablet Take 40 mg by mouth once daily.      rosuvastatin (CRESTOR) 5 MG tablet Take 5 mg by mouth once daily.      spironolactone (ALDACTONE) 25 MG tablet TAKE 1 TABLET (25 MG TOTAL) BY MOUTH ONCE DAILY. 90 tablet 3    UNABLE TO FIND medication name: METAMUCIL GUMMIES      UNABLE TO FIND medication name: GENTEAL TEARS EYE MOISTURIZER      UNABLE TO FIND Take by mouth 2 (two) times a day. medication name: CBD oil      valsartan (DIOVAN) 160 MG tablet TAKE 1 TABLET BY MOUTH EVERY DAY 90 tablet 3    mirabegron (MYRBETRIQ) 50 mg Tb24 Take 1 tablet (50 mg total) by mouth once daily. 90 tablet 3     Current Facility-Administered Medications   Medication Dose Route Frequency Provider Last Rate Last Admin    acetaminophen tablet 650 mg  650 mg Oral Once PRN Emmanuel Davila MD        albuterol inhaler 2 puff  2 puff Inhalation Q20 Min PRN Emmanuel Davila MD        diphenhydrAMINE injection 25 mg  25 mg Intravenous Once PRN Emmanuel Davila MD        EPINEPHrine (EPIPEN) 0.3 mg/0.3 mL pen injection 0.3 mg  0.3 mg Intramuscular PRN Emmanuel Davila MD        methylPREDNISolone sodium succinate injection 40 mg  40 mg Intravenous Once PRN Emmanuel Davila MD        ondansetron disintegrating tablet 4 mg  4 mg Oral Once PRN Emmanuel Davila MD        sodium chloride 0.9% 500 mL flush bag   Intravenous PRN Emmanuel GAGE  "MD Giovanni        sodium chloride 0.9% flush 10 mL  10 mL Intravenous PRN Emmanuel Davila MD           Review of patient's allergies indicates:   Allergen Reactions    No known drug allergies        Well woman:  Pap test: followed by Dr. Landrum annually; >64 yo.  History of abnormal paps: No.  History of STIs:  No  Mammogram: Date of last: 2/2021 (CE).  Result: Normal. Gets MMG + US.    Colonoscopy: Date of last: >10 years ago.  Result:  Normal per report.  Repeat due:  Per PCP.  Reports NEG stool kit 2021.     DEXA:  Date of last: 3/2021.  Result:  osteopenia.  Repeat due:  Per GYN.      ROS:  As per HPI.      Exam  /62 (BP Location: Left arm, Patient Position: Sitting, BP Method: Large (Manual))   Ht 5' 3" (1.6 m)   Wt 98 kg (216 lb 0.8 oz)   BMI 38.27 kg/m²   General: alert and oriented, no acute distress  Respiratory: normal respiratory effort  Abd: soft, non-tender, non-distended    Pelvic  Ext. Genitalia: normal external genitalia--erythema noted around introitus. Multiple sebaceous cysts noted on L labia. Normal bartholin's and skeens glands  Vagina: + atrophy. Normal vaginal mucosa without lesions. No discharge noted.   Non-tender bladder base without palpable mass.  Cervix: no lesions  Uterus:  uterus is normal size, shape, consistency and nontender   Urethra: no masses or tenderness  Urethral meatus: no lesions, caruncle or prolapse.        Impression  1. Mixed stress and urge urinary incontinence     2. Vaginal atrophy     3. Chronic constipation       We reviewed the above issues and discussed options for short-term versus long-term management of her problems.   Plan:   1)  Mixed urinary incontinence, urge > stress:    --urine C&S NEG  --UUI: subjectively sounds like more UUI; has tried previous meds for UUI without relief; +DI on UDS today  --AICHA: objectively has AICHA on exam  + AICHA on UDS today  --Empty bladder every 2-3 hours.  Empty well: wait a minute, lean forward on toilet. "    --Avoid dietary irritants (see sheet).  Keep diary x 3-5 days to determine your irritants.  --KEGELS: do 10 in AM and 10 in PM, holding each x 10 seconds.  When you feel urge to go, STOP, KEGEL, and when urge has passed, then go to bathroom.  Consider PT in future.    --URGE: consider medication in future. Takes 2-4 weeks to see if will have effect.  For dry mouth: get sour, sugar free lozenge or gum.    --STRESS:  Pessary vs. Sling.               --has appt with NP today for pessary fitting              --sexually active: would need to learn independent use (can  assist?)  --may also have functional component (uses walker); may need to consider bedside commode use     2)  Vaginal atrophy (dryness):   --use 0.5 gram of estrogen cream in vagina nightly x 2 weeks, then twice a week thereafter.   --may help urinary urgency/frequency and help prep vagina for pessary or surgery if needed     3)  Constipation:  --will discuss at follow up  Controlling constipation may help bladder urgency/leakage and fiber may better control cholesterol and blood glucose.    --continue daily fiber gummies: 3 in AM, 3 in PM  --hydrate well  --get some regular, gentle exercise  --start stool softener (ducosate sodium) 2x/day  --start magnesium oxide 400 mg daily     4)RTC 4 weeks for follow up     I spent a total of 20 minutes on the day of the visit.  This includes face to face time and non-face to face time preparing to see the patient (eg, review of tests), obtaining and/or reviewing separately obtained history, documenting clinical information in the electronic or other health record, independently interpreting results and communicating results to the patient/family/caregiver, or care coordinator.    Caitlin Hurley, [facility]  Division of Female Pelvic Medicine and Reconstructive Surgery  Department of Obstetrics & Gynecology

## 2022-04-08 ENCOUNTER — CLINICAL SUPPORT (OUTPATIENT)
Dept: REHABILITATION | Facility: OTHER | Age: 82
End: 2022-04-08
Payer: MEDICARE

## 2022-04-08 DIAGNOSIS — R29.898 WEAKNESS OF BOTH HIPS: ICD-10-CM

## 2022-04-08 DIAGNOSIS — R29.898 DECREASED STRENGTH OF TRUNK AND BACK: ICD-10-CM

## 2022-04-08 PROCEDURE — 97110 THERAPEUTIC EXERCISES: CPT | Performed by: PHYSICAL THERAPIST

## 2022-04-08 PROCEDURE — 97162 PT EVAL MOD COMPLEX 30 MIN: CPT | Performed by: PHYSICAL THERAPIST

## 2022-04-08 NOTE — PLAN OF CARE
[facility]   Pelvic Health Physical Therapy Initial Evaluation     Date: 2022   Name: Yun Silva  Clinic Number: [id number]    Therapy Diagnosis:   Encounter Diagnoses   Name Primary?    Decreased strength of trunk and back Yes    Weakness of both hips      Referring Provider: Doroteo Proctor MD    Referring Provider Orders: PT Eval and Treat  Medical Diagnosis from Referral: Urinary incontinence, urge [N39.41], Urinary urgency [R39.15]  Evaluation Date: 2022  Authorization Period Expiration: 2022  Plan of Care Expiration: 2022  Progress Note Due: 2022  Visit # / Visits authorized: /pending  FOTO: 1/3 (2022)    Precautions: standard    Time In: 15:00  Time Out: 16:05  Total Appointment Time (timed & untimed codes): 65 minutes    SUBJECTIVE     Date of onset: in the last few years  History of current condition: Liliana reports urinary urgency/frequency and some urine loss with transferring to stand/other activities that require her to fold forward or strain. She used to have more urgency and incontinence, but symptoms have improved markedly recently with new medication. She has been practicing kegels for a long time.    OB/GYN HISTORY -  (vaginal delivery), 1 D&C    BLADDER HISTORY   Frequency of urination:   Day: every 1-2           Night: 0-1   Difficulty initiating urine stream: No   Urine stream: weak and strong   Complete emptying: Yes   Post-void dribbling: No   Urinary Urgency: Yes - less since starting new medication   Bladder leakage: Yes - with transferring to stand and with urgency   Frequency of incidents: a few times a week   Amount leaked (urine): few drops   Form of protection: pad   Number of pads required in 24 hours: 1-2    BOWEL HISTORY   Frequency of bowel movements: every couple days (pt attributes to medications)   Difficulty initiating BM: No   Quality/Shape of BM: [place] Stool Chart 4   Does Patient Feel Empty  after BM? Yes   Fiber Supplements or Laxative Use? Yes - metamucil supplements   Colon leakage: No    SEXUAL/PELVIC PAIN HISTORY  · Sexually active? Yes   · Pain with vaginal exams, intercourse or tampon use? No  · Pain with wearing certain clothes, sitting on certain surfaces? Yes (pt attributes to vaginal dryness)    Pain: none  Imaging: no imaging pertinent to the pelvic floor    Prior Therapy: pulmonary, physical therapy after L TKA  Social History: lives in a house with stairs, with   Current exercise: none  Occupation: not working  Prior Level of Function: more urine leakage and urgency prior to starting myrbetriq  Current Level of Function:     Types of fluid intake: water, occasionally milk alternatives  Diet: unremarkable  Habitus: well developed, well nourished  Abuse/Neglect: No     Patients goals: reduce urgency, reduce incontinence     Medical History: Liliana  has a past medical history of Arthritis, Basal cell carcinoma, CHF (congestive heart failure), COPD (chronic obstructive pulmonary disease), DVT (deep venous thrombosis), Hypertension, Meningioma, Peripheral neuropathy, Pulmonary emboli, and Sleep apnea.     Surgical History: Yun Silva  has a past surgical history that includes Cholecystectomy; MOH's surgery; Eye surgery; Tonsillectomy; Dilation and curettage of uterus; Trigger finger release; Carpal tunnel release (Bilateral); and A-V cardiac pacemaker insertion (Left, 10/2/2018).    Medications: Yun has a current medication list which includes the following prescription(s): acebutolol, apixaban, aspirin, cranberry fruit concentrate, estradiol, ferrous sulfate, gabapentin, hydralazine, hydrochlorothiazide, mirabegron, folic acid/multivit-min/lutein, nifedipine, pantoprazole, rosuvastatin, spironolactone, UNABLE TO FIND, UNABLE TO FIND, UNABLE TO FIND, and valsartan, and the following Facility-Administered Medications: acetaminophen, albuterol, diphenhydramine, epinephrine,  methylprednisolone sodium succinate, ondansetron, sodium chloride 0.9% 500 mL flush bag, and sodium chloride 0.9%.    Allergies:   Review of patient's allergies indicates:   Allergen Reactions    No known drug allergies         OBJECTIVE     See EMR under MEDIA for written consent provided 4/8/2022  Chaperone: declined    ORTHO SCREEN  Posture in sitting: slouched   Posture in standing: anterior pelvic tilt  Standing load transfer: unable to demonstrate single-leg balance for more than 1-2 seconds, poor pelvic girdle stability    Hip Strength 4/8/2022   R hip flexion 4+/5   R hip external rotation 4/5   L hip flexion 4/5   L hip external rotation 4/5     ABDOMINAL WALL ASSESSMENT  Palpation: not tender  Pelvic Girdle Stability: Pt demonstrates moderately impaired ability to stabilize pelvis with Active Straight Leg Raise Test.  Scarring: large incision for gallbladder surgery (not painful, good mobility)  Pelvic Floor Muscle and Transverse Abdominus Synergy: not tested  Diastasis: present, >5-inch width, present with supine head lift  Pt unable to demonstrate transverse abdominis contraction upon command, despite extensive verbal and tactile cues    BREATHING MECHANICS ASSESSMENT   Thorax Assessment During Quiet Respiration: WNL excursion of ribcage  Thorax Assessment During Deep Respiration: WNL excursion of ribcage and Decreased excursion of abdominal wall     VAGINAL PELVIC FLOOR EXAM - deferred today    Limitation/Restriction for FOTO Pelvic Floor Surveys    Therapist reviewed FOTO scores for Yun Silva on 4/8/2022.   FOTO documents entered into [employer] - see Media section.    Limitation Score:   PFDI Urinary: 21% impairment  Urinary Problem: 38% impairment     TREATMENT     Total Treatment time (time-based codes) separate from the evaluation: 8 minutes     Therapeutic exercises to develop strength, endurance and core stabilization for 8 minutes - HEP building, TrA brace, PFM squeeze, bent knee fall out, sit  "to stand transfer with exhalation    PATIENT EDUCATION AND HOME EXERCISES     Education provided: general anatomy/physiology of urinary & bowel system, benefits of treatment, and alternative methods of treatment were discussed with the patient. Additionally, Yun was provided education on pt prognosis, PT plan of care, pelvic floor anatomy & function, relationship between TrA & PFM, relationship between hips & PFM, bladder irritants, urge suppression, etiology of urinary urgency, pelvic floor muscle strengthening for management of urinary urgency, timeline for muscle changes with consistent strength training and pressure management for support of pelvic ogran prolapse    Written Home Exercises provided: yes  Exercises were reviewed and Liliana was able to demonstrate them prior to the end of the session. Liliana demonstrated good understanding of the education provided. See EMR under "Patient Instructions" for exercises and education provided during session.    ASSESSMENT     Liliana is a 82 y.o. female referred to outpatient Physical Therapy with a medical diagnosis of urinary urgency and urge incontinence. Pt presents with deficits to pelvic floor muscle strength, endurance, and coordination, as well as hip weakness, functional core weakness, pelvic girdle stability deficits and diastasis recti. Based on patient report, she appears to be able to demonstrate pelvic floor muscle contraction correctly, but PT plans to assess next visit. Urinary issues likely related to pelvic floor weakness, abdominal wall weakness, and pressure management problems.    Patient prognosis is good  Liliana will benefit from skilled outpatient physical therapy to address the deficits stated above and in the chart below, provide patient/family education, and to maximize the patient's level of independence.     Plan of care discussed with patient: yes  Patient's spiritual, cultural and educational needs considered, and the patient is " agreeable to the plan of care and goals as stated below:     Anticipated Barriers for therapy: none    Medical Necessity is demonstrated by the following:  History  Co-morbidities and personal factors that may impact the plan of care Co-morbidities   Arthritis, Basal cell carcinoma, CHF (congestive heart failure), COPD (chronic obstructive pulmonary disease), DVT (deep venous thrombosis), Hypertension, Meningioma, Peripheral neuropathy, Pulmonary emboli, Sleep apnea, R knee pain, L TKA    Personal Factors  age     moderate   Examination  Body structures and functions, activity limitations and participation restrictions that may impact the plan of care Body Regions/Systems/Functions:  poor trunk stability, increased frequency of urination, poor coordination of pelvic floor muscles during ADL's leading to urinary or fecal leakage, chronic UTI due to dysfunctional voiding and unable to correctly contract transverse abdominis     Activity Limitations:  urgency , incontinence with ADLs and bathroom mapping    Participation Restrictions:  all ADLs/iADLs uninterrupted by urinary incontinence/urgency/frequency, social activities with friends/family and exercise restrictions due to incontinence     Activity limitations:   Learning and applying knowledge  no deficits    General Tasks and Commands  no deficits    Communication  no deficits    Mobility  lifting and carrying objects  walking    Self care  no deficits    Domestic Life  doing house work (cleaning house, washing dishes, laundry)    Interactions/Relationships  no deficits    Life Areas  no deficits    Community and Social Life  community life  recreation and leisure       moderate   Clinical Presentation stable and uncomplicated low   Decision Making/ Complexity Score: moderate       GOALS  Short Term Goals: 6 weeks   Pt to be independent with transverse abdominis and pelvic floor muscle bracing and be able to consistently perform correctly and quickly to help  "decrease incontinence with cough/laugh/sneeze. Established   Pt to perform "the knack" prior to transfers and bending over to decrease risk of incontinence. Established   Pt to report a decrease in pad usage to no more than 1 a day to demonstrate improving pelvic floor function needed for continence. Established   Pt to be able to delay the urge to urinate at least 5 minutes with a strong urge to urinate in order to make it to the bathroom without leaking. Established   Pt to report a decrease in urinary frequency to no more than once every 2 hours to improve ability to participate in social activities. Established   Pt to be independent with introductory home exercise program Established   Pt to demonstrate bilateral hip strength of at least 4/5 for improved pelvic girdle support with mobility Established     Long Term Goals: 12 weeks  Pt to no longer need to wear a pad for protection due to reduction of urinary incontinence. Established   Pt to report elimination of incontinence with ADLs to demonstrate improved pelvic floor muscle strength and coordination Established   Pt to report a decrease in urinary frequency to no more than once every 3 hours to improve ability to participate in social activities. Established   Pt to be independent with advanced home exercise program Established   Pt to demonstrate bilateral hip strength of at least 4+/5 for improved pelvic girdle support with mobility. Established       PLAN     Plan of Care certification: 4/8/2022 to 7/8/2022    Outpatient Physical Therapy - 1 time per week for 12 weeks to include the following interventions: Therapeutic Exercise, Manual Therapy, Therapeutic Activity, Neuromuscular Re-education, Electrical Stimulation Unattended, Self-Care, Patient Education    Latoya Holcomb, PT, DPT        I CERTIFY THE NEED FOR THESE SERVICES FURNISHED UNDER THIS PLAN OF TREATMENT AND WHILE UNDER MY CARE   Physician's comments:     Physician's Signature: " ___________________________________________________

## 2022-04-08 NOTE — PATIENT INSTRUCTIONS
Supine bent knee fall out (BKFO) - 20 reps  - Inhale to prepare, relax the belly and pelvic floor  - As you exhale, gently engage the transverse abdominis by drawing the belly button down to the spine  - Holding the hips level and the belly button down, gently drop one hip to the side (but only as far as you can control)  - Inhale again to rest, then alternate legs  *Don't hold your breath           PELVIC FLOOR PRESSURE MANAGEMENT         Your pelvic floor muscles and your diaphragm work closely together to regulate the pressure in your body. Each time you inhale, your diaphragm contracts, flattens, and moves downward. In response, your pelvic floor muscles relax and drop down as well. When you exhale, both muscles lift upwards. Throughout a diaphragmatic breath cycle, your pelvic floor goes through a full range of motion that contributes to its optimal function.    Think of your body like a canister, with one opening at the top where your mouth is and another opening at the bottom through your pelvic floor. If the top of the canister is closed off (as with straining during bowel movements or Valsalva maneuver during lifting) all of the pressure moves downward. If your pelvic floor muscles are not strong enough to counteract this increased pressure, you may experience leaking or increased sensations of pelvic organ prolapse.     This is why it is extremely important to implement the following pressure management techniques:  Avoiding Constipation - make high-fiber foods part of your regular diet (fruits, vegetables, bran, and beans), reduce the amount of processed foods in your diet, drink plenty of water and fluids every day, exercise daily, and manage your stress with meditation or other relaxation techniques.  No Straining! Straining (bearing down and holding your breath) puts additional downward pressure on our organs, causing increased prolapse and pelvic pressure. Instead, blow out the candles as you gently  push down. Do NOT hold your breath!  Use a Stool - Utilize a squat position when voiding. Get your knees up higher than your hips. Squatting helps relax the pelvic floor muscles and reduces straining.  Avoid heavy lifting and high-impact activities until you can strengthen your core and pelvic floor muscles appropriately. When you do have to lift, hold the load close to your body to reduce strain and do not hold your breath when lifting.  Do not Valsalva (hold your breath and push) at any time.  Use diaphragmatic breathing (belly breathing) to manage stress, help empty bladder, help empty bowels, and help lightly stretch pelvic floor muscles.   Gently squeeze your pelvic floor muscles prior to exertion (coughing, sneezing, lifting, transferring to stand) and avoid holding your breath

## 2022-04-18 ENCOUNTER — TELEPHONE (OUTPATIENT)
Dept: UROLOGY | Facility: CLINIC | Age: 82
End: 2022-04-18
Payer: MEDICARE

## 2022-04-18 ENCOUNTER — PATIENT MESSAGE (OUTPATIENT)
Dept: UROLOGY | Facility: CLINIC | Age: 82
End: 2022-04-18
Payer: MEDICARE

## 2022-04-18 NOTE — TELEPHONE ENCOUNTER
Pelvic Health 6-8 Week Behavior Check    Therapy Timed voiding, diet, PFE, bowel mgmt   Adherence No   Improvement Yes   Needs Additional Education? Yes   Desire to continue Yes     Topics Discussed:  Bowel mgmt- will send instructions via  per request. Is still straining     Pelvic Health 4 Week Medication Check    Medication  Myrbetriq 50 mg   Adherence Yes   SE No   Availability/Affordability Yes   Needs Identified No     Pelvic Health 2 Week PFE/PFPT/Insert Checkpoint:    Therapy PFPT   Obtained/Scheduled/Adherence? Yes     Reports symptoms are much better.

## 2022-04-20 DIAGNOSIS — N39.41 URINARY INCONTINENCE, URGE: ICD-10-CM

## 2022-04-20 DIAGNOSIS — R39.15 URINARY URGENCY: ICD-10-CM

## 2022-04-20 NOTE — TELEPHONE ENCOUNTER
----- Message from Deion Gar sent at 4/20/2022 11:09 AM CDT -----  Can the clinic reply in [facility]: No         Please refill the medication(s) listed below. Please call the patient when the prescription(s) is ready for  at this phone number    [phone]         Medication #1 mirabegron (MYRBETRIQ) 50 mg Tb24              Preferred Pharmacy: [facility] - [facility] [address]

## 2022-04-20 NOTE — TELEPHONE ENCOUNTER
Patient picked up dispense 30 of Myrbetriq 50mg at [facility].  Spoke with patients son, the Rx Myrbetriq 50mg patient would like a 90 day supply with [facility] pharmacy.     Hero GIBSON MA

## 2022-04-21 NOTE — TELEPHONE ENCOUNTER
Reached out to patient to informed her that the Rx Myrbetriq 50mg with 90day supply has been approved. Patient did not answer and was instructed to return the call to the office.     Hero GIBSON MA

## 2022-04-25 NOTE — PATIENT INSTRUCTIONS
1)  Mixed urinary incontinence, urge > stress:    --urine C&S NEG  --UUI: subjectively sounds like more UUI; has tried previous meds for UUI without relief; +DI on UDS today  --AICHA: objectively has AICHA on exam  + AICHA on UDS today  --Empty bladder every 2-3 hours.  Empty well: wait a minute, lean forward on toilet.    --Avoid dietary irritants (see sheet).  Keep diary x 3-5 days to determine your irritants.  --KEGELS: do 10 in AM and 10 in PM, holding each x 10 seconds.  When you feel urge to go, STOP, KEGEL, and when urge has passed, then go to bathroom.  Consider PT in future.    --URGE: consider medication in future. Takes 2-4 weeks to see if will have effect.  For dry mouth: get sour, sugar free lozenge or gum.    --STRESS:  Pessary vs. Sling.               --has appt with NP today for pessary fitting              --sexually active: would need to learn independent use (can  assist?)  --may also have functional component (uses walker); may need to consider bedside commode use     2)  Vaginal atrophy (dryness):   --use 0.5 gram of estrogen cream in vagina nightly x 2 weeks, then twice a week thereafter.   --may help urinary urgency/frequency and help prep vagina for pessary or surgery if needed     3)  Constipation:  --will discuss at follow up  Controlling constipation may help bladder urgency/leakage and fiber may better control cholesterol and blood glucose.    --continue daily fiber gummies: 3 in AM, 3 in PM  --hydrate well  --get some regular, gentle exercise  --start stool softener (ducosate sodium) 2x/day  --start magnesium oxide 400 mg daily     4)RTC 4 weeks for follow up

## 2022-04-27 ENCOUNTER — OFFICE VISIT (OUTPATIENT)
Dept: UROGYNECOLOGY | Facility: CLINIC | Age: 82
End: 2022-04-27
Payer: MEDICARE

## 2022-04-27 VITALS
BODY MASS INDEX: 38.32 KG/M2 | WEIGHT: 216.25 LBS | HEIGHT: 63 IN | DIASTOLIC BLOOD PRESSURE: 80 MMHG | SYSTOLIC BLOOD PRESSURE: 120 MMHG

## 2022-04-27 DIAGNOSIS — N90.7 VULVAR CYST: ICD-10-CM

## 2022-04-27 DIAGNOSIS — N39.46 MIXED STRESS AND URGE URINARY INCONTINENCE: Primary | ICD-10-CM

## 2022-04-27 DIAGNOSIS — N95.2 VAGINAL ATROPHY: ICD-10-CM

## 2022-04-27 DIAGNOSIS — K59.09 CHRONIC CONSTIPATION: ICD-10-CM

## 2022-04-27 PROCEDURE — 99213 OFFICE O/P EST LOW 20 MIN: CPT | Mod: S$GLB,,, | Performed by: NURSE PRACTITIONER

## 2022-04-27 PROCEDURE — 3288F FALL RISK ASSESSMENT DOCD: CPT | Mod: CPTII,S$GLB,, | Performed by: NURSE PRACTITIONER

## 2022-04-27 PROCEDURE — 1160F PR REVIEW ALL MEDS BY PRESCRIBER/CLIN PHARMACIST DOCUMENTED: ICD-10-PCS | Mod: CPTII,S$GLB,, | Performed by: NURSE PRACTITIONER

## 2022-04-27 PROCEDURE — 1125F PR PAIN SEVERITY QUANTIFIED, PAIN PRESENT: ICD-10-PCS | Mod: CPTII,S$GLB,, | Performed by: NURSE PRACTITIONER

## 2022-04-27 PROCEDURE — 3288F PR FALLS RISK ASSESSMENT DOCUMENTED: ICD-10-PCS | Mod: CPTII,S$GLB,, | Performed by: NURSE PRACTITIONER

## 2022-04-27 PROCEDURE — 1159F PR MEDICATION LIST DOCUMENTED IN MEDICAL RECORD: ICD-10-PCS | Mod: CPTII,S$GLB,, | Performed by: NURSE PRACTITIONER

## 2022-04-27 PROCEDURE — 3074F PR MOST RECENT SYSTOLIC BLOOD PRESSURE < 130 MM HG: ICD-10-PCS | Mod: CPTII,S$GLB,, | Performed by: NURSE PRACTITIONER

## 2022-04-27 PROCEDURE — 1125F AMNT PAIN NOTED PAIN PRSNT: CPT | Mod: CPTII,S$GLB,, | Performed by: NURSE PRACTITIONER

## 2022-04-27 PROCEDURE — 99213 PR OFFICE/OUTPT VISIT, EST, LEVL III, 20-29 MIN: ICD-10-PCS | Mod: S$GLB,,, | Performed by: NURSE PRACTITIONER

## 2022-04-27 PROCEDURE — 3079F PR MOST RECENT DIASTOLIC BLOOD PRESSURE 80-89 MM HG: ICD-10-PCS | Mod: CPTII,S$GLB,, | Performed by: NURSE PRACTITIONER

## 2022-04-27 PROCEDURE — 1101F PR PT FALLS ASSESS DOC 0-1 FALLS W/OUT INJ PAST YR: ICD-10-PCS | Mod: CPTII,S$GLB,, | Performed by: NURSE PRACTITIONER

## 2022-04-27 PROCEDURE — 3074F SYST BP LT 130 MM HG: CPT | Mod: CPTII,S$GLB,, | Performed by: NURSE PRACTITIONER

## 2022-04-27 PROCEDURE — 1159F MED LIST DOCD IN RCRD: CPT | Mod: CPTII,S$GLB,, | Performed by: NURSE PRACTITIONER

## 2022-04-27 PROCEDURE — 3079F DIAST BP 80-89 MM HG: CPT | Mod: CPTII,S$GLB,, | Performed by: NURSE PRACTITIONER

## 2022-04-27 PROCEDURE — 99999 PR PBB SHADOW E&M-EST. PATIENT-LVL V: CPT | Mod: PBBFAC,,, | Performed by: NURSE PRACTITIONER

## 2022-04-27 PROCEDURE — 1160F RVW MEDS BY RX/DR IN RCRD: CPT | Mod: CPTII,S$GLB,, | Performed by: NURSE PRACTITIONER

## 2022-04-27 PROCEDURE — 1101F PT FALLS ASSESS-DOCD LE1/YR: CPT | Mod: CPTII,S$GLB,, | Performed by: NURSE PRACTITIONER

## 2022-04-27 PROCEDURE — 99999 PR PBB SHADOW E&M-EST. PATIENT-LVL V: ICD-10-PCS | Mod: PBBFAC,,, | Performed by: NURSE PRACTITIONER

## 2022-04-27 NOTE — PATIENT INSTRUCTIONS
1)  Mixed urinary incontinence, urge > stress:    --UUI: subjectively sounds like more UUI; has tried previous meds for UUI without relief; +DI on UDS today  --AICHA: objectively has AICHA on exam  + AICHA on UDS today  --Empty bladder every 2-3 hours.  Empty well: wait a minute, lean forward on toilet.    --Avoid dietary irritants (see sheet).  Keep diary x 3-5 days to determine your irritants.  --KEGELS: do 10 in AM and 10 in PM, holding each x 10 seconds.  When you feel urge to go, STOP, KEGEL, and when urge has passed, then go to bathroom.  Consider PT in future.    --URGE: consider medication in future. Takes 2-4 weeks to see if will have effect.  For dry mouth: get sour, sugar free lozenge or gum.    --STRESS:  Pessary vs. Sling.               --has appt with NP today for pessary fitting              --sexually active: would need to learn independent use (can  assist?)  --may also have functional component (uses walker); may need to consider bedside commode use     2)  Vaginal atrophy (dryness):   --use 0.5 gram of estrogen cream in vagina nightly  twice a week thereafter.   --may help urinary urgency/frequency and help prep vagina for pessary or surgery if needed     3)  Constipation:  --will discuss at follow up  Controlling constipation may help bladder urgency/leakage and fiber may better control cholesterol and blood glucose.    --continue daily fiber gummies: 3 in AM, 3 in PM  --hydrate well  --get some regular, gentle exercise  --start stool softener (ducosate sodium) 2x/day  --start magnesium oxide 400 mg daily     4)sebaceous cyst on L labia  --use aquaphor on labia daily to protect from rubbing    6)RTC 6 months for follow up

## 2022-04-27 NOTE — PROGRESS NOTES
Urogyn follow up  04/27/2022  .  [facility] - UROGYNECOLOGY  [address] [id number]    Yun Silva  [id number]  1940      Yun Silva is a 82 y.o. here for a urogyn follow up of mixed urinary incontinence.    Last HPI from 11/17/2021    1)  UI:  (--) AICHA.  (+) UUI  X years.  Increased urgency.   (+) pads:2+/day, usually variable wetness--wetter if waits too long and 1-2 pullups at night usually severe wetness.  Daytime frequency: Q 45 min- 1 hours. Sometimes goes longer stretches.  Takes diuretics in AM. Stopped coffee. Shaking in car makes worse. Nocturia: Yes: 1-2/night.   (--) dysuria,  (--) hematuria,  (--) frequent UTIs. +h/o UTI requiring hospitalization--no symptoms.   (+) complete bladder emptying. Pushes to help.  Feels like she struggles to get it out.   --has seen URO in past--tried several meds, none really helped; Botox was suggested but didn't want to do this  --uses walker s/p knee surgery and for balance; bathrooms are accessible; has beside commode--only uses periodically  --sounds like had UDS a few years ago     2)  POP:  Absent.(--) vaginal bleeding. (--) vaginal discharge. (+) sexually active.  (--) dyspareunia.  (--)  Vaginal dryness.  (--) vaginal estrogen use.      3)  BM:  (+) constipation/straining--has to work on constantly. Takes 3 fiber gummies BID--not helping as much as she'd like.   (--) chronic diarrhea. (--) hematochezia.  (--) fecal incontinence.  (--) fecal smearing/urgency.  (+) complete evacuation.       Changes from last visit:  1)  Mixed urinary incontinence, urge > stress:    --UUI improved with myrbetriq 50 mg daily  --voiding every 45 minutes - 1 hour.  --+ AICHA with standing  --using 1-3 pads with minimal to moderate wetness  --has not done pelvic floor PT     2)  Vaginal atrophy (dryness):   --not using vaginal estrogen cream     3)  Constipation:  --+constipation  --taking fiber gummies: 3 in AM, 3 in PM  --drinking approximately  32-48 ounces/ day  --not taking stool softener (ducosate sodium) 2x/day  --not taking magnesium oxide 400 mg daily    Changes since last visit:  1)  Mixed urinary incontinence, urge > stress:    --UUI improved with myrbetriq 50 mg daily  --voiding every 45 minutes - 1 hour.  --+ AICHA with standing  --using 1-3 pads with minimal to moderate wetness  --has not done pelvic floor PT     2)  Vaginal atrophy (dryness):   --not using vaginal estrogen cream     3)  Constipation:  --+constipation  --taking fiber gummies: 3 in AM, 3 in PM  --drinking approximately 32-48 ounces/ day  --not taking stool softener (ducosate sodium) 2x/day  --not taking magnesium oxide 400 mg daily      4)reports labia is not as irritated        Past Medical History:   Diagnosis Date    Arthritis     Basal cell carcinoma     CHF (congestive heart failure)     COPD (chronic obstructive pulmonary disease)     DVT (deep venous thrombosis)     Hypertension     Meningioma     Peripheral neuropathy     Pulmonary emboli     Sleep apnea        Past Surgical History:   Procedure Laterality Date    A-V CARDIAC PACEMAKER INSERTION Left 10/2/2018    Procedure: INSERTION, CARDIAC PACEMAKER, DUAL CHAMBER;  Surgeon: Otoniel Hogan MD;  Location: [facility] CATH LAB;  Service: Cardiology;  Laterality: Left;    CARPAL TUNNEL RELEASE Bilateral     CHOLECYSTECTOMY      DILATION AND CURETTAGE OF UTERUS      EYE SURGERY      bilateral cataracts    MOH's surgery      TONSILLECTOMY      TRIGGER FINGER RELEASE         Family History   Problem Relation Age of Onset    Hypertension Mother     Heart disease Mother         arrythmias    Heart disease Father         MI    Heart disease Brother         MI at 64    Diabetes Neg Hx        Social History     Socioeconomic History    Marital status:    Tobacco Use    Smoking status: Never Smoker    Smokeless tobacco: Never Used   Substance and Sexual Activity    Alcohol use: No    Drug use: No        Current Outpatient Medications   Medication Sig Dispense Refill    acebutolol (SECTRAL) 200 MG capsule Take 1 capsule (200 mg total) by mouth 2 (two) times daily. 180 capsule 3    apixaban (ELIQUIS) 5 mg Tab Take 5 mg by mouth once daily.      aspirin (ECOTRIN) 81 MG EC tablet Take 162 mg by mouth.       cranberry fruit concentrate (AZO CRANBERRY ORAL) Take by mouth once daily.      estradioL (ESTRACE) 0.01 % (0.1 mg/gram) vaginal cream 0.5 grams with applicator or dime-sized amount with finger in vagina nightly x 2 weeks, then twice a week thereafter 42.5 g 11    ferrous sulfate (FEOSOL) 325 mg (65 mg iron) Tab tablet Take 1 tablet (325 mg total) by mouth every other day. 45 tablet 3    gabapentin (NEURONTIN) 800 MG tablet TAKE 1 TABLET THREE TIMES DAILY 270 tablet 3    hydrALAZINE (APRESOLINE) 50 MG tablet Take 50 mg by mouth 4 (four) times daily.      hydroCHLOROthiazide (HYDRODIURIL) 25 MG tablet Take 1 tablet (25 mg total) by mouth every Mon, Wed, Fri. 90 tablet 3    mirabegron (MYRBETRIQ) 50 mg Tb24 Take 1 tablet (50 mg total) by mouth once daily. 90 tablet 3    MULTIVITAMIN W-MINERALS/LUTEIN (CENTRUM SILVER ORAL) Take by mouth.      NIFEdipine (PROCARDIA-XL) 60 MG (OSM) 24 hr tablet Take 60 mg by mouth once daily.      pantoprazole (PROTONIX) 40 MG tablet Take 40 mg by mouth once daily.      rosuvastatin (CRESTOR) 5 MG tablet Take 5 mg by mouth once daily.      spironolactone (ALDACTONE) 25 MG tablet TAKE 1 TABLET (25 MG TOTAL) BY MOUTH ONCE DAILY. 90 tablet 3    UNABLE TO FIND medication name: METAMUCIL GUMMIES      UNABLE TO FIND medication name: GENTEAL TEARS EYE MOISTURIZER      UNABLE TO FIND Take by mouth 2 (two) times a day. medication name: CBD oil      valsartan (DIOVAN) 160 MG tablet TAKE 1 TABLET BY MOUTH EVERY DAY 90 tablet 3     Current Facility-Administered Medications   Medication Dose Route Frequency Provider Last Rate Last Admin    acetaminophen tablet 650 mg  650 mg  "Oral Once PRN Emmanuel Davila MD        albuterol inhaler 2 puff  2 puff Inhalation Q20 Min PRN Emmanuel Davila MD        diphenhydrAMINE injection 25 mg  25 mg Intravenous Once PRN Emmanuel Davila MD        EPINEPHrine (EPIPEN) 0.3 mg/0.3 mL pen injection 0.3 mg  0.3 mg Intramuscular PRN Emmanuel Davila MD        methylPREDNISolone sodium succinate injection 40 mg  40 mg Intravenous Once PRN Emmanuel Davila MD        ondansetron disintegrating tablet 4 mg  4 mg Oral Once PRN Emmanuel Davila MD        sodium chloride 0.9% 500 mL flush bag   Intravenous PRN Emmanuel Davila MD        sodium chloride 0.9% flush 10 mL  10 mL Intravenous PRN Emmaunel Davila MD           Review of patient's allergies indicates:   Allergen Reactions    No known drug allergies        Well woman:  Pap test: followed by Dr. Landrum annually; >64 yo.  History of abnormal paps: No.  History of STIs:  No  Mammogram: Date of last: 2/2021 (CE).  Result: Normal. Gets MMG + US.    Colonoscopy: Date of last: >10 years ago.  Result:  Normal per report.  Repeat due:  Per PCP.  Reports NEG stool kit 2021.     DEXA:  Date of last: 3/2021.  Result:  osteopenia.  Repeat due:  Per GYN.      ROS:  As per HPI.      Exam  /80 (BP Location: Right arm, Patient Position: Sitting, BP Method: Large (Manual))   Ht 5' 3" (1.6 m)   Wt 98.1 kg (216 lb 4.3 oz)   BMI 38.31 kg/m²   General: alert and oriented, no acute distress  Respiratory: normal respiratory effort  Abd: soft, non-tender, non-distended    Pelvic  Ext. Genitalia: multiple sebaceous cysts noted on L labia-- no longer irritated. Normal bartholin's and skeens glands  Vagina: + atrophy. Normal vaginal mucosa without lesions. No discharge noted.   Non-tender bladder base without palpable mass.  Cervix: no lesions  Uterus:  uterus is normal size, shape, consistency and nontender   Urethra: no masses or tenderness  Urethral meatus: no lesions, caruncle or " prolapse.        Impression  1. Mixed stress and urge urinary incontinence     2. Chronic constipation     3. Vaginal atrophy     4. Vulvar cyst       We reviewed the above issues and discussed options for short-term versus long-term management of her problems.   Plan:   1)  Mixed urinary incontinence, urge > stress:    --UUI: subjectively sounds like more UUI; has tried previous meds for UUI without relief; +DI on UDS today  --AICHA: objectively has AICHA on exam  + AICHA on UDS today  --Empty bladder every 2-3 hours.  Empty well: wait a minute, lean forward on toilet.    --Avoid dietary irritants (see sheet).  Keep diary x 3-5 days to determine your irritants.  --KEGELS: do 10 in AM and 10 in PM, holding each x 10 seconds.  When you feel urge to go, STOP, KEGEL, and when urge has passed, then go to bathroom.  Consider PT in future.    --URGE: consider medication in future. Takes 2-4 weeks to see if will have effect.  For dry mouth: get sour, sugar free lozenge or gum.    --STRESS:  Pessary vs. Sling.               --has appt with NP today for pessary fitting              --sexually active: would need to learn independent use (can  assist?)  --may also have functional component (uses walker); may need to consider bedside commode use     2)  Vaginal atrophy (dryness):   --use 0.5 gram of estrogen cream in vagina nightly  twice a week thereafter.   --may help urinary urgency/frequency and help prep vagina for pessary or surgery if needed     3)  Constipation:  --will discuss at follow up  Controlling constipation may help bladder urgency/leakage and fiber may better control cholesterol and blood glucose.    --continue daily fiber gummies: 3 in AM, 3 in PM  --hydrate well  --get some regular, gentle exercise  --start stool softener (ducosate sodium) 2x/day  --start magnesium oxide 400 mg daily     4)sebaceous cyst on L labia  --use aquaphor on labia daily to protect from rubbing    6)RTC 6 months for follow up     I  spent a total of 20 minutes on the day of the visit.  This includes face to face time and non-face to face time preparing to see the patient (eg, review of tests), obtaining and/or reviewing separately obtained history, documenting clinical information in the electronic or other health record, independently interpreting results and communicating results to the patient/family/caregiver, or care coordinator.    Caitlin Hurley, AZAM-BC  [facility]  Division of Female Pelvic Medicine and Reconstructive Surgery  Department of Obstetrics & Gynecology

## 2022-05-03 NOTE — PROGRESS NOTES
Pelvic Health Physical Therapy   Treatment Note     Name: Yun Sliva  Clinic Number: [id number]    Therapy Diagnosis:   Encounter Diagnoses   Name Primary?    Decreased strength of trunk and back Yes    Weakness of both hips     Coordination impairment      Referring Provider: Doroteo Proctor MD    Visit Date: 5/6/2022    Referring Provider Orders: PT Eval and Treat  Medical Diagnosis from Referral: Urinary incontinence, urge [N39.41], Urinary urgency [R39.15]  Evaluation Date: 4/8/2022  Authorization Period Expiration: 12/20/2022  Plan of Care Expiration: 7/8/2022  Progress Note Due: 6/8/2022  Visit # / Visits authorized: 2/25  FOTO: 1/3 (4/8/2022)    Cancelled Visits: -  No Show Visits: -    Time In: 13:00  Time Out: 14:00  Total Billable Time: 60 minutes    Precautions: Standard    Subjective     Liliana reports continued urinary urgency and occasional leakage, but the frequency has improved with trying to distract herself.    She was somewhat compliant with home exercise program.  Response to previous treatment: no adverse effect  Functional change: slightly reduced urinary frequency    Objective     VAGINAL PELVIC FLOOR EXAM - external assessment  Introitus: gaping  Skin condition: redness noted  Scarring: none   Sensation: WNL   Pain: none  Voluntary contraction: visible lift  Voluntary relaxation: visible drop  Bearing down: bulge     VAGINAL PELVIC FLOOR EXAM - internal assessment  Pain: tender areas noted as follows: bilateral urogenital diaphragm and levator ani   Sensation: able to sense generalized pressure, unable to identify location   Vaginal vault: roomy   Muscle Bulk: atrophy   Muscle Power: 2/5  Muscle Endurance: 5 seconds  # Reps To Fatigue: 5    Fast Contractions in 10 seconds: 4     Quality of contraction: slow rise, decreased hold and incomplete relaxation   Specificity: WNL   Coordination: tends to hold breath during PFM contration and cannot co-contract PFM and abdominals   Prolapse  "check: Grade 1 cystocele      Liliana received the following interventions during the treatment session:   (TrA = transverse abdominis, PFM = pelvic floor muscle)    Therapeutic activities to improve functional performance for 10 minutes -  [x] Urge suppression techniques  [x] The Knack  [x] Pressure management and breath control with mobility    Manual therapy techniques: to develop flexibility, desensitization, and extensibility for 20 minutes -  Pt consented to pelvic floor muscle assessment and treatment - See EMR.  [] Obturator release at ischial tuberosities + active release with resisted contralateral hip external rotation (B)  [] Manual release to pelvic floor muscles externally: levator ani, superficial transverse perineal, perineal body  [x] Manual release to pelvic floor muscles internally/vaginally: urogenital diaphragm  [x] Soft tissue mobilization to lower abdominal wall    Neuromuscular re-education activities to develop Coordination and Control for 23 minutes -  [x] PFM squeezes coordinated with exhalation, x5   [x] TrA brace coordinated with exhalation, x10 - pt tends to demonstrate oblique-dominant abdominal contraction but improves with verbal and tactile cues for TrA activation, making "ssss" sound  [x] PFM&TrA brace, 2x10 - pt requires verbal and tactile cues      Home Exercises and Patient Education     Patient Education: progression of plan of care, plan for next session, pt prognosis, PT plan of care, pelvic floor anatomy & function, relationship between TrA & PFM, bladder irritants, urge suppression, etiology of urinary urgency, etiology of stress urinary incontinence, the knack for management of stress urinary incontinence, pelvic floor muscle strengthening for management of urinary urgency, timeline for muscle changes with consistent strength training, abdominal wall massage for management of urinary urgency, etiology & PT management of diastasis recti and implications of diastasis recti " "for central and peripheral muscle control    Home Exercise Program: the knack, urge suppression, TrA&PFM squeezes  Home Exercise Program Updates: none  Exercises were reviewed, and Liliana was able to demonstrate them prior to the end of the session, as needed. Liliana demonstrated good  understanding of the education provided.     Assessment     Pt tolerated treatment session well, with no increased symptoms with exercise and improving exercise technique within session. Pt able to demonstrate appropriate TrA contraction by the end of the session. Pt's functional mobility and ability to perform ADLs still limited by functional core and pelvic floor weakness, coordination deficits. She requires skilled therapy for continued patient education and progressive resistance exercise.    Liliana is progressing well towards her goals.   Pt prognosis: good    Pt will continue to benefit from skilled outpatient physical therapy to address the deficits listed in the problem list box on initial evaluation, provide pt/family education and to maximize pt's level of independence in the home and community environment.     Pt's spiritual, cultural and educational needs considered and pt agreeable to plan of care and goals.  Anticipated barriers to physical therapy: none    Short Term Goals: 6 weeks   Pt to be independent with transverse abdominis and pelvic floor muscle bracing and be able to consistently perform correctly and quickly to help decrease incontinence with cough/laugh/sneeze. Established   Pt to perform "the knack" prior to transfers and bending over to decrease risk of incontinence. Established   Pt to report a decrease in pad usage to no more than 1 a day to demonstrate improving pelvic floor function needed for continence. Established   Pt to be able to delay the urge to urinate at least 5 minutes with a strong urge to urinate in order to make it to the bathroom without leaking. Established   Pt to report a " decrease in urinary frequency to no more than once every 2 hours to improve ability to participate in social activities. Established   Pt to be independent with introductory home exercise program Established   Pt to demonstrate bilateral hip strength of at least 4/5 for improved pelvic girdle support with mobility Established      Long Term Goals: 12 weeks  Pt to no longer need to wear a pad for protection due to reduction of urinary incontinence. Established   Pt to report elimination of incontinence with ADLs to demonstrate improved pelvic floor muscle strength and coordination Established   Pt to report a decrease in urinary frequency to no more than once every 3 hours to improve ability to participate in social activities. Established   Pt to be independent with advanced home exercise program Established   Pt to demonstrate bilateral hip strength of at least 4+/5 for improved pelvic girdle support with mobility. Established          Plan     Continue per Plan of Care      Latoya Holcomb, PT, DPT

## 2022-05-06 ENCOUNTER — CLINICAL SUPPORT (OUTPATIENT)
Dept: REHABILITATION | Facility: OTHER | Age: 82
End: 2022-05-06
Payer: MEDICARE

## 2022-05-06 DIAGNOSIS — R29.898 DECREASED STRENGTH OF TRUNK AND BACK: Primary | ICD-10-CM

## 2022-05-06 DIAGNOSIS — R27.8 COORDINATION IMPAIRMENT: ICD-10-CM

## 2022-05-06 DIAGNOSIS — R29.898 WEAKNESS OF BOTH HIPS: ICD-10-CM

## 2022-05-06 PROCEDURE — 97530 THERAPEUTIC ACTIVITIES: CPT | Performed by: PHYSICAL THERAPIST

## 2022-05-06 PROCEDURE — 97112 NEUROMUSCULAR REEDUCATION: CPT | Performed by: PHYSICAL THERAPIST

## 2022-05-06 PROCEDURE — 97140 MANUAL THERAPY 1/> REGIONS: CPT | Performed by: PHYSICAL THERAPIST

## 2022-09-12 PROBLEM — R29.898 DECREASED STRENGTH OF TRUNK AND BACK: Status: RESOLVED | Noted: 2022-04-08 | Resolved: 2022-09-12

## 2022-09-12 PROBLEM — R29.898 HIP WEAKNESS: Status: RESOLVED | Noted: 2022-04-08 | Resolved: 2022-09-12

## 2022-09-12 PROBLEM — R27.8 COORDINATION IMPAIRMENT: Status: RESOLVED | Noted: 2022-05-06 | Resolved: 2022-09-12

## 2025-01-03 PROBLEM — I27.82 OTHER CHRONIC PULMONARY EMBOLISM WITH ACUTE COR PULMONALE: Status: ACTIVE | Noted: 2025-01-03

## 2025-01-03 PROBLEM — I49.5 SICK SINUS SYNDROME: Status: ACTIVE | Noted: 2025-01-03

## 2025-01-03 PROBLEM — I26.09 OTHER CHRONIC PULMONARY EMBOLISM WITH ACUTE COR PULMONALE: Status: ACTIVE | Noted: 2025-01-03

## 2025-03-10 ENCOUNTER — HOSPITAL ENCOUNTER (OUTPATIENT)
Dept: RADIOLOGY | Facility: OTHER | Age: 85
Discharge: HOME | End: 2025-03-10
Attending: INTERNAL MEDICINE

## 2025-03-10 DIAGNOSIS — R05.9 COUGH, UNSPECIFIED TYPE: ICD-10-CM

## 2025-03-10 PROCEDURE — 71046 X-RAY EXAM CHEST 2 VIEWS: CPT | Mod: 26,,, | Performed by: RADIOLOGY

## 2025-03-10 PROCEDURE — 71046 X-RAY EXAM CHEST 2 VIEWS: CPT | Mod: TC,FY

## 2025-05-23 ENCOUNTER — TELEPHONE (OUTPATIENT)
Dept: UROGYNECOLOGY | Facility: CLINIC | Age: 85
End: 2025-05-23
Payer: MEDICARE

## 2025-05-23 NOTE — TELEPHONE ENCOUNTER
----- Message from Sonal Harp MD sent at 5/23/2025  9:14 AM CDT -----  Happy to see her. We will reach out and see if we can find a time soon.   ----- Message -----  From: Gabe Pinto MD  Sent: 5/23/2025   9:08 AM CDT  To: Sonal Harp MD; Katiuska Pruitt Staff    Sonal,Can you see Ms. Silva for incontinence and prolapse please?  She is interest is having something done.I know her and her family very well.ThanksSean

## 2025-05-23 NOTE — TELEPHONE ENCOUNTER
----- Message from Gabe Pinto MD sent at 5/23/2025  9:07 AM CDT -----  Sonal,Can you see Ms. Silva for incontinence and prolapse please?  She is interest is having something done.I know her and her family very well.ThanksSean

## 2025-06-20 RX ORDER — ALPHA LIPOIC ACID 600 MG
TABLET ORAL DAILY
COMMUNITY

## 2025-06-20 NOTE — PRE-PROCEDURE INSTRUCTIONS
Information to Prepare you for your Surgery    PRE-ADMIT TESTING -  [phone]    [address] [facility]          Your surgery has been scheduled at [facility]. We are pleased to have the opportunity to serve you. For Further Information please call [phone].    On the day of surgery please report to the Information Desk on the 1st floor.    CONTACT YOUR PHYSICIAN'S OFFICE THE DAY PRIOR TO YOUR SURGERY TO OBTAIN YOUR ARRIVAL TIME.     The evening before surgery do not eat anything after 9 p.m. ( this includes hard candy, chewing gum and mints).  You may only have GATORADE, POWERADE AND WATER  from 9 p.m. until you leave your home.   DO NOT DRINK ANY LIQUIDS ON THE WAY TO THE HOSPITAL.      Why does your anesthesiologist allow you to drink Gatorade/Powerade before surgery?  Gatorade/Powerade helps to increase your comfort before surgery and to decrease your nausea after surgery. The carbohydrates in Gatorade/Powerade help reduce your body's stress response to surgery.  If you are a diabetic-drink only water prior to surgery.    Outpatient Surgery- May allow 2 adult (18 and older) Support Persons (1 being the designated ) for all surgical/procedural patients. A breastfeeding mother will be allowed her infant and 2 adult Support Persons. No one under the age of 18 will be allowed in the building.       MEDICATION INSTRUCTIONS:     Take the below medicines morning of surgery:    Acebutolol   Hydralazine   Nifedipine  Bactrim   (Do not take Valsartan or Spirinolactone on morning of surgery)    If you take ASPIRIN - Your PHYSICIAN/SURGEON will inform you IF/OR when you need to stop taking aspirin prior to your surgery.     The week prior to surgery do not ot take any medications containing IBUPROFEN or NSAIDS ( Advil, Motrin, Goodys, BC, Aleve, Naproxen etc) If you are not sure if you should take a medicine please call your surgeon's office.  Ok to take Tylenol    Do Not Wear  any make-up (especially eye make-up) to surgery. Please remove any false eyelashes or eyelash extensions. If you arrive the day of surgery with makeup/eyelashes on you will be required to remove prior to surgery. (There is a risk of corneal abrasions if eye makeup/eyelash extensions are not removed)      Leave all valuables at home.   Do Not wear any jewelry or watches, including any metal in body piercings. Jewelry must be removed prior to coming to the hospital.  There is a possibility that rings that are unable to be removed may be cut off if they are on the surgical extremity.    Please remove all hair extensions, wigs, clips and any other metal accessories/ ornaments from your hair.  These items may pose a flammable/fire risk in Surgery and must be removed.    Do not shave your surgical area at least 5 days prior to your surgery. The surgical prep will be performed at the hospital according to Infection Control regulations.    Contact Lens must be removed before surgery. Either do not wear the contact lens or bring a case and solution for storage.  Please bring a container for eyeglasses or dentures as required.  Bring any paperwork your physician has provided, such as consent forms,  history and physicals, doctor's orders, etc.   Bring comfortable clothes that are loose fitting to wear upon discharge. Take into consideration the type of surgery being performed.  Maintain your diet as advised per your physician the day prior to surgery.      Adequate rest the night before surgery is advised.   Park in the Parking lot behind the hospital or in the [facility] across the street from the parking lot. Parking is complimentary.  If you will be discharged the same day as your procedure, please arrange for a responsible adult to drive you home or to accompany you if traveling by taxi.   YOU WILL NOT BE PERMITTED TO DRIVE OR TO LEAVE THE HOSPITAL ALONE AFTER SURGERY.   If you are being discharged the same  day, it is strongly recommended that you arrange for someone to remain with you for the first 24 hrs following your surgery.    The Surgeon will speak to your family/visitor after your surgery regarding the outcome of your surgery and post op care.  The Surgeon may speak to you after your surgery, but there is a possibility you may not remember the details.  Please check with your family members regarding the conversation with the Surgeon.    We strongly recommend whoever is bringing you home be present for discharge instructions.  This will ensure a thorough understanding for your post op home care.    If the patient has fever, cough, or signs/symptoms of Flu or Covid please do not come in for your surgery. Contact your surgeon and your primary care physician for further instructions.           Thank you for your cooperation.  The Staff of [facility].            Bathing Instructions with Hibiclens    Shower the evening before and morning of your procedure with Chlorhexidine (Hibiclens)  do not use Chlorhexidine on your face or genitals. Do not get in your eyes.  Wash your face with water and your regular face wash/soap  Use your regular shampoo  Apply Chlorhexidine (Hibiclens) directly on your skin or on a wet washcloth and wash gently. When showering: Move away from the shower stream when applying Chlorhexidine (Hibiclens) to avoid rinsing off too soon.  Rinse thoroughly with warm water  Do not dilute Chlorhexidine (Hibiclens)   Dry off as usual, do not use any deodorant, powder, body lotions, perfume, after shave or cologne.

## 2025-06-23 ENCOUNTER — ANESTHESIA EVENT (OUTPATIENT)
Dept: SURGERY | Facility: OTHER | Age: 85
End: 2025-06-23
Payer: MEDICARE

## 2025-06-24 ENCOUNTER — HOSPITAL ENCOUNTER (OUTPATIENT)
Facility: OTHER | Age: 85
Discharge: HOME | End: 2025-06-24
Attending: ORTHOPAEDIC SURGERY | Admitting: ORTHOPAEDIC SURGERY
Payer: MEDICARE

## 2025-06-24 ENCOUNTER — ANESTHESIA (OUTPATIENT)
Dept: SURGERY | Facility: OTHER | Age: 85
End: 2025-06-24
Payer: MEDICARE

## 2025-06-24 VITALS
TEMPERATURE: 98 F | OXYGEN SATURATION: 95 % | WEIGHT: 209 LBS | DIASTOLIC BLOOD PRESSURE: 65 MMHG | RESPIRATION RATE: 16 BRPM | SYSTOLIC BLOOD PRESSURE: 140 MMHG | HEIGHT: 63 IN | BODY MASS INDEX: 37.03 KG/M2 | HEART RATE: 61 BPM

## 2025-06-24 DIAGNOSIS — M65.311 TRIGGER THUMB OF RIGHT HAND: Primary | ICD-10-CM

## 2025-06-24 PROCEDURE — 37000009 HC ANESTHESIA EA ADD 15 MINS: Performed by: ORTHOPAEDIC SURGERY

## 2025-06-24 PROCEDURE — 71000016 HC POSTOP RECOV ADDL HR: Performed by: ORTHOPAEDIC SURGERY

## 2025-06-24 PROCEDURE — 36000706: Performed by: ORTHOPAEDIC SURGERY

## 2025-06-24 PROCEDURE — 25000003 PHARM REV CODE 250: Performed by: ORTHOPAEDIC SURGERY

## 2025-06-24 PROCEDURE — 71000015 HC POSTOP RECOV 1ST HR: Performed by: ORTHOPAEDIC SURGERY

## 2025-06-24 PROCEDURE — 25000003 PHARM REV CODE 250

## 2025-06-24 PROCEDURE — 37000008 HC ANESTHESIA 1ST 15 MINUTES: Performed by: ORTHOPAEDIC SURGERY

## 2025-06-24 PROCEDURE — 36000707: Performed by: ORTHOPAEDIC SURGERY

## 2025-06-24 PROCEDURE — 63600175 PHARM REV CODE 636 W HCPCS: Performed by: NURSE ANESTHETIST, CERTIFIED REGISTERED

## 2025-06-24 RX ORDER — LIDOCAINE HYDROCHLORIDE 10 MG/ML
0.5 INJECTION, SOLUTION EPIDURAL; INFILTRATION; INTRACAUDAL; PERINEURAL ONCE
Status: DISCONTINUED | OUTPATIENT
Start: 2025-06-24 | End: 2025-06-24 | Stop reason: HOSPADM

## 2025-06-24 RX ORDER — SODIUM CHLORIDE 0.9 % (FLUSH) 0.9 %
3 SYRINGE (ML) INJECTION
Status: DISCONTINUED | OUTPATIENT
Start: 2025-06-24 | End: 2025-06-24 | Stop reason: HOSPADM

## 2025-06-24 RX ORDER — HYDROMORPHONE HYDROCHLORIDE 2 MG/ML
0.4 INJECTION, SOLUTION INTRAMUSCULAR; INTRAVENOUS; SUBCUTANEOUS EVERY 5 MIN PRN
Status: DISCONTINUED | OUTPATIENT
Start: 2025-06-24 | End: 2025-06-24 | Stop reason: HOSPADM

## 2025-06-24 RX ORDER — SODIUM CHLORIDE, SODIUM LACTATE, POTASSIUM CHLORIDE, CALCIUM CHLORIDE 600; 310; 30; 20 MG/100ML; MG/100ML; MG/100ML; MG/100ML
INJECTION, SOLUTION INTRAVENOUS CONTINUOUS
Status: DISCONTINUED | OUTPATIENT
Start: 2025-06-24 | End: 2025-06-24 | Stop reason: HOSPADM

## 2025-06-24 RX ORDER — PROCHLORPERAZINE EDISYLATE 5 MG/ML
5 INJECTION INTRAMUSCULAR; INTRAVENOUS EVERY 30 MIN PRN
Status: DISCONTINUED | OUTPATIENT
Start: 2025-06-24 | End: 2025-06-24 | Stop reason: HOSPADM

## 2025-06-24 RX ORDER — LIDOCAINE HYDROCHLORIDE 20 MG/ML
INJECTION INTRAVENOUS
Status: DISCONTINUED | OUTPATIENT
Start: 2025-06-24 | End: 2025-06-24

## 2025-06-24 RX ORDER — BUPIVACAINE HYDROCHLORIDE AND EPINEPHRINE 2.5; 5 MG/ML; UG/ML
INJECTION, SOLUTION EPIDURAL; INFILTRATION; INTRACAUDAL; PERINEURAL
Status: DISCONTINUED | OUTPATIENT
Start: 2025-06-24 | End: 2025-06-24 | Stop reason: HOSPADM

## 2025-06-24 RX ORDER — PROPOFOL 10 MG/ML
VIAL (ML) INTRAVENOUS
Status: DISCONTINUED | OUTPATIENT
Start: 2025-06-24 | End: 2025-06-24

## 2025-06-24 RX ORDER — OXYCODONE HYDROCHLORIDE 5 MG/1
5 TABLET ORAL EVERY 4 HOURS PRN
Status: DISCONTINUED | OUTPATIENT
Start: 2025-06-24 | End: 2025-06-24 | Stop reason: HOSPADM

## 2025-06-24 RX ORDER — ACETAMINOPHEN 500 MG
1000 TABLET ORAL
Status: COMPLETED | OUTPATIENT
Start: 2025-06-24 | End: 2025-06-24

## 2025-06-24 RX ORDER — DIPHENHYDRAMINE HYDROCHLORIDE 50 MG/ML
12.5 INJECTION, SOLUTION INTRAMUSCULAR; INTRAVENOUS EVERY 30 MIN PRN
Status: DISCONTINUED | OUTPATIENT
Start: 2025-06-24 | End: 2025-06-24 | Stop reason: HOSPADM

## 2025-06-24 RX ORDER — GLUCAGON 1 MG
1 KIT INJECTION
Status: DISCONTINUED | OUTPATIENT
Start: 2025-06-24 | End: 2025-06-24 | Stop reason: HOSPADM

## 2025-06-24 RX ADMIN — SODIUM CHLORIDE, SODIUM LACTATE, POTASSIUM CHLORIDE, AND CALCIUM CHLORIDE: .6; .31; .03; .02 INJECTION, SOLUTION INTRAVENOUS at 01:06

## 2025-06-24 RX ADMIN — ACETAMINOPHEN 1000 MG: 500 TABLET, FILM COATED ORAL at 11:06

## 2025-06-24 RX ADMIN — PROPOFOL 30 MG: 10 INJECTION, EMULSION INTRAVENOUS at 01:06

## 2025-06-24 RX ADMIN — PROPOFOL 20 MG: 10 INJECTION, EMULSION INTRAVENOUS at 01:06

## 2025-06-24 RX ADMIN — LIDOCAINE HYDROCHLORIDE 50 MG: 20 INJECTION, SOLUTION INTRAVENOUS at 01:06

## 2025-06-24 NOTE — ANESTHESIA PREPROCEDURE EVALUATION
06/24/2025  Yun Silva is a 85 y.o., female.    Anesthesia Evaluation    I have reviewed the Patient Summary Reports.     I have reviewed the Nursing Notes.    I have reviewed the Medications.     Review of Systems  Anesthesia Hx:  No problems with previous Anesthesia             Denies Family Hx of Anesthesia complications.    Denies Personal Hx of Anesthesia complications.                    Social:  Non-Smoker       Hematology/Oncology:       -- Anemia:                    --  Cancer in past history:       Other (see Oncology comments)          Oncology Comments: Skin CA     EENT/Dental:  EENT/Dental Normal           Cardiovascular:  Exercise tolerance: poor  Pacemaker Hypertension    Dysrhythmias  Angina CHF   PVD  RUDD   Hx PE/DVT    Pacemaker for SSS    Mild pulm HTN                           Pulmonary:   COPD     Sleep Apnea                Renal/:  Renal/ Normal                 Musculoskeletal:  Arthritis               Neurological:    Neuromuscular Disease,       Meningioma      Peripheral Neuropathy                          Endocrine:  Endocrine Normal            Dermatological:  Skin Normal    Psych:  Psychiatric Normal                  Physical Exam  General:  Well nourished and Obesity       Airway/Jaw/Neck:  Airway Findings: Mouth Opening: Normal     Tongue: Normal      General Airway Assessment: Adult      Mallampati: I   TM Distance: Normal, at least 6 cm   Jaw/Neck Findings:     Neck ROM: Normal ROM          Dental:  Dental Findings: In tact                Anesthesia Plan  Type of Anesthesia, risks & benefits discussed:  Anesthesia Type:  MAC    Patient's Preference:   Plan Factors:          Intra-op Monitoring Plan:   Intra-op Monitoring Plan Comments:   Post Op Pain Control Plan:   Post Op Pain Control Plan Comments:     Induction:    Beta Blocker:         Informed Consent: Informed  consent signed with the Patient and all parties understand the risks and agree with anesthesia plan.  All questions answered.  Anesthesia consent signed with patient.  ASA Score: 3     Day of Surgery Review of History & Physical:        Anesthesia Plan Notes: Cards note 5/2025 - reviewed in [facility]        Ready For Surgery From Anesthesia Perspective.           Physical Exam  General: Well nourished and Obesity    Airway:  Mallampati: I   Mouth Opening: Normal  TM Distance: Normal, at least 6 cm  Tongue: Normal  Neck ROM: Normal ROM    Dental:  In tact      Anesthesia Plan  Type of Anesthesia, risks & benefits discussed:    Anesthesia Type: MAC  Informed Consent: Informed consent signed with the Patient and all parties understand the risks and agree with anesthesia plan.  All questions answered.   ASA Score: 3  Anesthesia Plan Notes: Cards note 5/2025 - reviewed in [facility]    Ready For Surgery From Anesthesia Perspective.     .

## 2025-06-24 NOTE — PLAN OF CARE
Yun Silva has met all discharge criteria from Phase II. Vital Signs are stable, ambulating  without difficulty. Discharge instructions given, patient verbalized understanding. Discharged from facility via wheelchair in stable condition.

## 2025-06-24 NOTE — BRIEF OP NOTE
Surgery Date: 06/24/2025  Patient Name: Yun Silva  CSN: [id number]  Surgeon(s) and Role:    Claude S. Williams IV, MD - Primary    Pre-op Diagnosis:  Trigger thumb of right hand [M65.311]    Post-op Diagnosis:  Trigger thumb of right hand [M65.311]    Procedure(s) (LRB):  RELEASE, TRIGGER FINGER / RIGHT THUMB (Right)      PROCEDURE IN DETAIL: After proper informed consent, the patient was transported   to the Operating Suite. The right hand was thoroughly prepped with alcohol,   Betadine and draped in the usual sterile fashion. Preoperative routine timeout   was taken, and the operative site was identified by the operative team. After   Esmarch exsanguination, a padded upper arm tourniquet was then elevated to 250   mmHg. An incision was then made in the right palm overlying the thumb MP joint flexion crease and careful dissection was carried down through the subdermal tissue using bipolar cautery for hemostasisand careful dissection was carried down through the subdermal tissue using bipolar cautery for hemostasis.  Care was taken to protect the neurovascular structures as careful dissection was carried down to the level of the flexor sheath.  The A1 pulley was identified as was the more proximal palmar pulley.  The sheath was divided including the entire A1 pulley and palmar pulley under direct visualization.  No pathology of the flexor tendon was noted. The patient was awakened and was able to flex and extend the digits fully without any further catching.  The tourniquet was deflated and meticulous hemostasis was confirmed.  The incision was then closed with nylon interrupted suture. A sterile soft dressing was then applied. The patient was awakened in the operative suite and taken to the recovery area in stable condition. The procedure was tolerated very well.  Lap instrument and needle counts were correct.        COMPLICATIONS: None.      Anesthesia: Local MAC    Estimated Blood Loss: minimal        "  Specimens     None          Discharge Note    SUMMARY     Admit Date: 6/24/2025    Discharge Date and Time: 06/24/2025 2:07 PM    Hospital Course (synopsis of major diagnoses, care, treatment, and services provided during the course of the hospital stay): uneventful     Final Diagnosis: Post-Op Diagnosis Codes:     * Trigger thumb of right hand [M65.311]    Disposition: Home or Self Care    Follow Up/Patient Instructions:     Medications:  Reconciled Home Medications:      Medication List        CONTINUE taking these medications      acebutoloL 200 MG capsule  Commonly known as: SECTRAL  Take 1 capsule (200 mg total) by mouth 2 (two) times daily.     albuterol 90 mcg/actuation inhaler  Commonly known as: PROVENTIL/VENTOLIN HFA  Inhale 2 puffs into the lungs every 6 (six) hours as needed.     alpha lipoic acid 600 mg Tab  Take by mouth Daily.     apixaban 5 mg Tab  Commonly known as: ELIQUIS  Take 1 tablet (5 mg total) by mouth 2 (two) times daily.     aspirin 81 MG EC tablet  Commonly known as: ECOTRIN  Take 162 mg by mouth.     AZO CRANBERRY ORAL  Take by mouth once daily.     CENTRUM SILVER ORAL  Take by mouth Daily.     estradioL 0.01 % (0.1 mg/gram) vaginal cream  Commonly known as: ESTRACE  0.5 grams with applicator or dime-sized amount with finger in vagina nightly x 2 weeks, then twice a week thereafter     gabapentin 400 MG capsule  Commonly known as: NEURONTIN  Take 1 capsule (400 mg total) by mouth every evening.     hydrALAZINE 50 MG tablet  Commonly known as: APRESOLINE  Take 1 tablet (50 mg total) by mouth 4 (four) times daily.     hydrocolloid dressing 4 X 5 " Bndg  Change dressing daily     NIFEdipine 60 MG (OSM) 24 hr tablet  Commonly known as: PROCARDIA-XL  Take 1 tablet (60 mg total) by mouth once daily.     rosuvastatin 5 MG tablet  Commonly known as: CRESTOR  Take 5 mg by mouth every evening.     spironolactone 25 MG tablet  Commonly known as: ALDACTONE  Take 1 tablet (25 mg total) by mouth " once daily.     sulfamethoxazole-trimethoprim 800-160mg 800-160 mg Tab  Commonly known as: BACTRIM DS  Take 1 tablet by mouth 2 (two) times daily. For chin infection.     UNABLE TO FIND  Daily. medication name: METAMUCIL GUMMIES     UNABLE TO FIND  medication name: GENTEAL TEARS EYE MOISTURIZER     UNABLE TO FIND  Take by mouth 2 (two) times a day. medication name: CBD oil     valsartan 160 MG tablet  Commonly known as: DIOVAN  Take 1 tablet (160 mg total) by mouth once daily.            STOP taking these medications      pantoprazole 40 MG tablet  Commonly known as: PROTONIX     propranoloL 10 MG tablet  Commonly known as: INDERAL            Discharge Procedure Orders   Diet general     Leave dressing on - Keep it clean, dry, and intact until clinic visit     Call MD for:  temperature >100.4     Call MD for:  persistent nausea and vomiting     Call MD for:  severe uncontrolled pain     Call MD for:  difficulty breathing, headache or visual disturbances     Call MD for:  redness, tenderness, or signs of infection (pain, swelling, redness, odor or green/yellow discharge around incision site)     Call MD for:  hives     Call MD for:  persistent dizziness or light-headedness     Call MD for:  extreme fatigue     Keep surgical extremity elevated     Lifting restrictions     No driving, operating heavy equipment or signing legal documents while taking pain medication      Follow-up Information       Williams, Claude S. IV, MD Follow up in 1 week(s).    Specialty: Orthopedic Surgery  Why: For suture removal, For wound re-check  Contact information:  [address]  [facility] [address]  [phone]

## 2025-06-24 NOTE — ANESTHESIA POSTPROCEDURE EVALUATION
Anesthesia Post Evaluation    Patient: Yun Silva    Procedure(s) Performed: Procedure(s) (LRB):  RELEASE, TRIGGER FINGER / RIGHT THUMB (Right)    Final Anesthesia Type: MAC      Patient location during evaluation: [facility]  Patient participation: Yes- Able to Participate  Level of consciousness: awake and alert, awake and oriented  Post-procedure vital signs: reviewed and stable  Pain management: adequate  Airway patency: patent    PONV status at discharge: No PONV  Anesthetic complications: no      Cardiovascular status: blood pressure returned to baseline, hemodynamically stable and stable  Respiratory status: spontaneous ventilation, unassisted and room air  Hydration status: euvolemic  Follow-up not needed.          Vitals Value Taken Time   /56 06/24/25 11:25   Temp 36.7 °C (98 °F) 06/24/25 11:25   Pulse 64 06/24/25 11:25   Resp 16 06/24/25 11:25   SpO2 94 % 06/24/25 11:25         No case tracking events are documented in the log.      Pain/Abhishek Score: Pain Rating Prior to Med Admin: 0 (6/24/2025 11:47 AM)           Discharged

## 2025-06-27 ENCOUNTER — PATIENT OUTREACH (OUTPATIENT)
Dept: ADMINISTRATIVE | Facility: CLINIC | Age: 85
End: 2025-06-27
Payer: MEDICARE

## 2025-06-27 NOTE — PROGRESS NOTES
C3 nurse spoke with Yun Silav ( son)  for a TCC post hospital discharge follow up call. The patient does not have a scheduled HOSFU appointment with Yoni Jennings MD  within 5-7 days post hospital discharge date 06/24/2025. C3 nurse was unable to schedule HOSFU appointment in [facility].  Please contact patient and schedule follow up appointment using HOSFU visit type on or before 06/30/2025.

## (undated) DEVICE — NDL HYPO STD REG BVL 18GX1.5IN

## (undated) DEVICE — SOL IRR SOD CHL .9% POUR

## (undated) DEVICE — UNDERPAD ULTRASORB 300LB 30X36

## (undated) DEVICE — SPONGE COTTON TRAY 4X4IN

## (undated) DEVICE — ALCOHOL ISOPROPYL BLU 70% 16OZ

## (undated) DEVICE — NDL HYPO REG 25G X 1 1/2

## (undated) DEVICE — CUFF TOURNIQUET DL PRT

## (undated) DEVICE — PACK UPPER EXTREMITY BAPTIST

## (undated) DEVICE — UNDERGLOVES BIOGEL PI SIZE 8

## (undated) DEVICE — PAD CAST SPECIALIST STRL 4

## (undated) DEVICE — WRAP COBAN NL STRL 6INX5YD

## (undated) DEVICE — GLOVE BIOGEL SKINSENSE PI 7.5

## (undated) DEVICE — SUT 4/0 18IN ETHILON BL P3

## (undated) DEVICE — DRESSING XEROFORM NONADH 1X8IN

## (undated) DEVICE — CORD BIPOLAR 12 FOOT

## (undated) DEVICE — SYR 10CC LUER LOCK

## (undated) DEVICE — WRAP COHES MULTCLR NS 3INX5YD